# Patient Record
Sex: FEMALE | Race: WHITE | NOT HISPANIC OR LATINO | Employment: FULL TIME | URBAN - METROPOLITAN AREA
[De-identification: names, ages, dates, MRNs, and addresses within clinical notes are randomized per-mention and may not be internally consistent; named-entity substitution may affect disease eponyms.]

---

## 2024-11-03 ENCOUNTER — HOSPITAL ENCOUNTER (INPATIENT)
Facility: HOSPITAL | Age: 49
LOS: 3 days | Discharge: HOME | DRG: 101 | End: 2024-11-08
Attending: EMERGENCY MEDICINE | Admitting: FAMILY MEDICINE
Payer: COMMERCIAL

## 2024-11-03 ENCOUNTER — APPOINTMENT (OUTPATIENT)
Dept: CT IMAGING | Facility: HOSPITAL | Age: 49
DRG: 101 | End: 2024-11-03
Payer: COMMERCIAL

## 2024-11-03 ENCOUNTER — APPOINTMENT (OUTPATIENT)
Dept: GENERAL RADIOLOGY | Facility: HOSPITAL | Age: 49
DRG: 101 | End: 2024-11-03
Payer: COMMERCIAL

## 2024-11-03 DIAGNOSIS — R41.82 ALTERED MENTAL STATUS, UNSPECIFIED ALTERED MENTAL STATUS TYPE: Primary | ICD-10-CM

## 2024-11-03 DIAGNOSIS — R26.2 DIFFICULTY IN WALKING: ICD-10-CM

## 2024-11-03 LAB
ALBUMIN SERPL-MCNC: 3.4 G/DL (ref 3.5–5.2)
ALBUMIN/GLOB SERPL: 1.1 G/DL
ALP SERPL-CCNC: 80 U/L (ref 39–117)
ALT SERPL W P-5'-P-CCNC: 13 U/L (ref 1–33)
ANION GAP SERPL CALCULATED.3IONS-SCNC: 9.2 MMOL/L (ref 5–15)
AST SERPL-CCNC: 15 U/L (ref 1–32)
BASOPHILS # BLD AUTO: 0.05 10*3/MM3 (ref 0–0.2)
BASOPHILS NFR BLD AUTO: 0.5 % (ref 0–1.5)
BILIRUB SERPL-MCNC: 0.4 MG/DL (ref 0–1.2)
BILIRUB UR QL STRIP: NEGATIVE
BUN SERPL-MCNC: 11 MG/DL (ref 6–20)
BUN/CREAT SERPL: 13.8 (ref 7–25)
CALCIUM SPEC-SCNC: 8.8 MG/DL (ref 8.6–10.5)
CHLORIDE SERPL-SCNC: 103 MMOL/L (ref 98–107)
CLARITY UR: CLEAR
CO2 SERPL-SCNC: 27.8 MMOL/L (ref 22–29)
COLOR UR: YELLOW
CREAT SERPL-MCNC: 0.8 MG/DL (ref 0.57–1)
D-LACTATE SERPL-SCNC: 1.1 MMOL/L (ref 0.5–2)
DEPRECATED RDW RBC AUTO: 36.9 FL (ref 37–54)
DIGOXIN SERPL-MCNC: 0.75 NG/ML (ref 0.6–1.2)
EGFRCR SERPLBLD CKD-EPI 2021: 89.9 ML/MIN/1.73
EOSINOPHIL # BLD AUTO: 0.2 10*3/MM3 (ref 0–0.4)
EOSINOPHIL NFR BLD AUTO: 2 % (ref 0.3–6.2)
ERYTHROCYTE [DISTWIDTH] IN BLOOD BY AUTOMATED COUNT: 12.1 % (ref 12.3–15.4)
ETHANOL BLD-MCNC: <10 MG/DL (ref 0–10)
ETHANOL UR QL: <0.01 %
FENTANYL UR-MCNC: NEGATIVE NG/ML
FLUAV SUBTYP SPEC NAA+PROBE: NOT DETECTED
FLUBV RNA ISLT QL NAA+PROBE: NOT DETECTED
GLOBULIN UR ELPH-MCNC: 3.1 GM/DL
GLUCOSE SERPL-MCNC: 155 MG/DL (ref 65–99)
GLUCOSE UR STRIP-MCNC: NEGATIVE MG/DL
HCT VFR BLD AUTO: 37.5 % (ref 34–46.6)
HGB BLD-MCNC: 12.6 G/DL (ref 12–15.9)
HGB UR QL STRIP.AUTO: NEGATIVE
HOLD SPECIMEN: NORMAL
IMM GRANULOCYTES # BLD AUTO: 0.04 10*3/MM3 (ref 0–0.05)
IMM GRANULOCYTES NFR BLD AUTO: 0.4 % (ref 0–0.5)
KETONES UR QL STRIP: NEGATIVE
LEUKOCYTE ESTERASE UR QL STRIP.AUTO: NEGATIVE
LYMPHOCYTES # BLD AUTO: 2.13 10*3/MM3 (ref 0.7–3.1)
LYMPHOCYTES NFR BLD AUTO: 20.9 % (ref 19.6–45.3)
MCH RBC QN AUTO: 28.6 PG (ref 26.6–33)
MCHC RBC AUTO-ENTMCNC: 33.6 G/DL (ref 31.5–35.7)
MCV RBC AUTO: 85 FL (ref 79–97)
MONOCYTES # BLD AUTO: 0.65 10*3/MM3 (ref 0.1–0.9)
MONOCYTES NFR BLD AUTO: 6.4 % (ref 5–12)
NEUTROPHILS NFR BLD AUTO: 69.8 % (ref 42.7–76)
NEUTROPHILS NFR BLD AUTO: 7.12 10*3/MM3 (ref 1.7–7)
NITRITE UR QL STRIP: NEGATIVE
NRBC BLD AUTO-RTO: 0 /100 WBC (ref 0–0.2)
NT-PROBNP SERPL-MCNC: <36 PG/ML (ref 0–900)
PH UR STRIP.AUTO: 6 [PH] (ref 5–8)
PLATELET # BLD AUTO: 272 10*3/MM3 (ref 140–450)
PMV BLD AUTO: 8.4 FL (ref 6–12)
POTASSIUM SERPL-SCNC: 4 MMOL/L (ref 3.5–5.2)
PROT SERPL-MCNC: 6.5 G/DL (ref 6–8.5)
PROT UR QL STRIP: NEGATIVE
RBC # BLD AUTO: 4.41 10*6/MM3 (ref 3.77–5.28)
RSV RNA NPH QL NAA+NON-PROBE: NOT DETECTED
SARS-COV-2 RNA RESP QL NAA+PROBE: NOT DETECTED
SODIUM SERPL-SCNC: 140 MMOL/L (ref 136–145)
SP GR UR STRIP: 1.02 (ref 1–1.03)
T4 FREE SERPL-MCNC: 1.54 NG/DL (ref 0.92–1.68)
TROPONIN T SERPL HS-MCNC: <6 NG/L
TSH SERPL DL<=0.05 MIU/L-ACNC: 0.01 UIU/ML (ref 0.27–4.2)
UROBILINOGEN UR QL STRIP: NORMAL
WBC NRBC COR # BLD AUTO: 10.19 10*3/MM3 (ref 3.4–10.8)
WHOLE BLOOD HOLD COAG: NORMAL

## 2024-11-03 PROCEDURE — 87154 CUL TYP ID BLD PTHGN 6+ TRGT: CPT | Performed by: EMERGENCY MEDICINE

## 2024-11-03 PROCEDURE — 93005 ELECTROCARDIOGRAM TRACING: CPT | Performed by: EMERGENCY MEDICINE

## 2024-11-03 PROCEDURE — 80307 DRUG TEST PRSMV CHEM ANLYZR: CPT | Performed by: EMERGENCY MEDICINE

## 2024-11-03 PROCEDURE — 36415 COLL VENOUS BLD VENIPUNCTURE: CPT

## 2024-11-03 PROCEDURE — 80050 GENERAL HEALTH PANEL: CPT | Performed by: EMERGENCY MEDICINE

## 2024-11-03 PROCEDURE — 87040 BLOOD CULTURE FOR BACTERIA: CPT | Performed by: EMERGENCY MEDICINE

## 2024-11-03 PROCEDURE — 84439 ASSAY OF FREE THYROXINE: CPT | Performed by: EMERGENCY MEDICINE

## 2024-11-03 PROCEDURE — 84484 ASSAY OF TROPONIN QUANT: CPT | Performed by: EMERGENCY MEDICINE

## 2024-11-03 PROCEDURE — 80162 ASSAY OF DIGOXIN TOTAL: CPT | Performed by: EMERGENCY MEDICINE

## 2024-11-03 PROCEDURE — P9612 CATHETERIZE FOR URINE SPEC: HCPCS

## 2024-11-03 PROCEDURE — 87147 CULTURE TYPE IMMUNOLOGIC: CPT | Performed by: EMERGENCY MEDICINE

## 2024-11-03 PROCEDURE — 87637 SARSCOV2&INF A&B&RSV AMP PRB: CPT | Performed by: EMERGENCY MEDICINE

## 2024-11-03 PROCEDURE — 83880 ASSAY OF NATRIURETIC PEPTIDE: CPT | Performed by: EMERGENCY MEDICINE

## 2024-11-03 PROCEDURE — 81025 URINE PREGNANCY TEST: CPT | Performed by: INTERNAL MEDICINE

## 2024-11-03 PROCEDURE — 70450 CT HEAD/BRAIN W/O DYE: CPT

## 2024-11-03 PROCEDURE — 83605 ASSAY OF LACTIC ACID: CPT | Performed by: EMERGENCY MEDICINE

## 2024-11-03 PROCEDURE — 81003 URINALYSIS AUTO W/O SCOPE: CPT | Performed by: EMERGENCY MEDICINE

## 2024-11-03 PROCEDURE — 99285 EMERGENCY DEPT VISIT HI MDM: CPT

## 2024-11-03 PROCEDURE — 82077 ASSAY SPEC XCP UR&BREATH IA: CPT | Performed by: EMERGENCY MEDICINE

## 2024-11-03 PROCEDURE — 71045 X-RAY EXAM CHEST 1 VIEW: CPT

## 2024-11-03 RX ORDER — SODIUM CHLORIDE, SODIUM LACTATE, POTASSIUM CHLORIDE, CALCIUM CHLORIDE 600; 310; 30; 20 MG/100ML; MG/100ML; MG/100ML; MG/100ML
75 INJECTION, SOLUTION INTRAVENOUS CONTINUOUS
Status: DISCONTINUED | OUTPATIENT
Start: 2024-11-04 | End: 2024-11-04

## 2024-11-03 RX ORDER — DIGOXIN 250 MCG
250 TABLET ORAL DAILY
COMMUNITY
Start: 2024-10-27

## 2024-11-03 RX ORDER — LEVOTHYROXINE SODIUM 200 UG/1
200 TABLET ORAL DAILY
COMMUNITY

## 2024-11-03 RX ORDER — PROMETHAZINE HYDROCHLORIDE 25 MG/1
25 TABLET ORAL EVERY 6 HOURS PRN
COMMUNITY
Start: 2024-08-29 | End: 2024-11-03

## 2024-11-03 RX ORDER — OMEGA-3-ACID ETHYL ESTERS 1 G/1
2 CAPSULE, LIQUID FILLED ORAL
COMMUNITY
Start: 2024-07-24 | End: 2024-11-03

## 2024-11-03 RX ORDER — DICLOFENAC SODIUM 1 MG/ML
1 SOLUTION/ DROPS OPHTHALMIC 4 TIMES DAILY
COMMUNITY

## 2024-11-03 RX ORDER — ERGOCALCIFEROL 1.25 MG/1
50000 CAPSULE, LIQUID FILLED ORAL
COMMUNITY
Start: 2024-07-25

## 2024-11-03 RX ORDER — PRAVASTATIN SODIUM 40 MG
40 TABLET ORAL NIGHTLY
COMMUNITY
Start: 2024-07-25

## 2024-11-03 RX ORDER — DULOXETIN HYDROCHLORIDE 30 MG/1
30 CAPSULE, DELAYED RELEASE ORAL DAILY
COMMUNITY
Start: 2024-10-22

## 2024-11-03 RX ORDER — ATOGEPANT 60 MG/1
1 TABLET ORAL DAILY
COMMUNITY
Start: 2024-10-07

## 2024-11-03 RX ORDER — TRAZODONE HYDROCHLORIDE 50 MG/1
50 TABLET, FILM COATED ORAL
COMMUNITY
Start: 2024-10-14 | End: 2024-11-03

## 2024-11-03 RX ORDER — DULOXETIN HYDROCHLORIDE 60 MG/1
60 CAPSULE, DELAYED RELEASE ORAL DAILY
COMMUNITY
Start: 2024-10-14

## 2024-11-03 RX ORDER — DILTIAZEM HYDROCHLORIDE 240 MG/1
240 CAPSULE, EXTENDED RELEASE ORAL DAILY
COMMUNITY
Start: 2024-06-24

## 2024-11-03 RX ORDER — FUROSEMIDE 40 MG/1
40 TABLET ORAL DAILY
COMMUNITY
Start: 2024-06-24 | End: 2024-11-03

## 2024-11-03 RX ORDER — LANSOPRAZOLE 30 MG/1
30 CAPSULE, DELAYED RELEASE ORAL DAILY
COMMUNITY
Start: 2024-01-12 | End: 2025-01-11

## 2024-11-03 RX ORDER — METOPROLOL SUCCINATE 50 MG/1
50 TABLET, EXTENDED RELEASE ORAL 2 TIMES DAILY
COMMUNITY
Start: 2024-10-27

## 2024-11-03 RX ORDER — DOXYCYCLINE 100 MG/1
100 CAPSULE ORAL 2 TIMES DAILY
COMMUNITY
End: 2024-11-08 | Stop reason: HOSPADM

## 2024-11-03 RX ORDER — HYDROXYZINE HYDROCHLORIDE 25 MG/1
1 TABLET, FILM COATED ORAL EVERY 8 HOURS PRN
COMMUNITY
Start: 2024-10-28

## 2024-11-04 PROBLEM — E66.01 MORBID OBESITY: Status: ACTIVE | Noted: 2024-11-04

## 2024-11-04 PROBLEM — I10 ESSENTIAL HYPERTENSION: Status: ACTIVE | Noted: 2024-11-04

## 2024-11-04 PROBLEM — E03.9 HYPOTHYROIDISM (ACQUIRED): Status: ACTIVE | Noted: 2024-11-04

## 2024-11-04 LAB
AMPHET+METHAMPHET UR QL: NEGATIVE
AMPHETAMINES UR QL: NEGATIVE
ARTERIAL PATENCY WRIST A: POSITIVE
ATMOSPHERIC PRESS: 744.3 MMHG
B-HCG UR QL: NEGATIVE
BACTERIA BLD CULT: ABNORMAL
BARBITURATES UR QL SCN: NEGATIVE
BASE EXCESS BLDA CALC-SCNC: 3.7 MMOL/L (ref -2–2)
BDY SITE: ABNORMAL
BENZODIAZ UR QL SCN: POSITIVE
BOTTLE TYPE: ABNORMAL
BUPRENORPHINE SERPL-MCNC: NEGATIVE NG/ML
CANNABINOIDS SERPL QL: POSITIVE
COCAINE UR QL: NEGATIVE
GEN 5 2HR TROPONIN T REFLEX: <6 NG/L
HCO3 BLDA-SCNC: 28.1 MMOL/L (ref 22–26)
HCT VFR BLD CALC: 39 % (ref 38–51)
HEMODILUTION: NO
HGB BLDA-MCNC: 13.3 G/DL (ref 12–18)
INHALED O2 CONCENTRATION: 21 %
METHADONE UR QL SCN: NEGATIVE
MODALITY: ABNORMAL
OPIATES UR QL: NEGATIVE
OXYCODONE UR QL SCN: NEGATIVE
PCO2 BLDA: 40.7 MM HG (ref 35–45)
PCP UR QL SCN: NEGATIVE
PH BLDA: 7.45 PH UNITS (ref 7.35–7.45)
PO2 BLD: 363 MM[HG] (ref 0–500)
PO2 BLDA: 76.2 MM HG (ref 80–100)
QT INTERVAL: 394 MS
QTC INTERVAL: 467 MS
SAO2 % BLDCOA: 95.7 % (ref 95–99)
TRICYCLICS UR QL SCN: NEGATIVE
TROPONIN T DELTA: NORMAL

## 2024-11-04 PROCEDURE — 82803 BLOOD GASES ANY COMBINATION: CPT

## 2024-11-04 PROCEDURE — 36600 WITHDRAWAL OF ARTERIAL BLOOD: CPT

## 2024-11-04 PROCEDURE — G0378 HOSPITAL OBSERVATION PER HR: HCPCS

## 2024-11-04 PROCEDURE — 84484 ASSAY OF TROPONIN QUANT: CPT | Performed by: EMERGENCY MEDICINE

## 2024-11-04 PROCEDURE — 92610 EVALUATE SWALLOWING FUNCTION: CPT

## 2024-11-04 PROCEDURE — 25810000003 LACTATED RINGERS PER 1000 ML: Performed by: FAMILY MEDICINE

## 2024-11-04 PROCEDURE — 97161 PT EVAL LOW COMPLEX 20 MIN: CPT

## 2024-11-04 PROCEDURE — 25010000002 ENOXAPARIN PER 10 MG: Performed by: FAMILY MEDICINE

## 2024-11-04 PROCEDURE — 99222 1ST HOSP IP/OBS MODERATE 55: CPT | Performed by: FAMILY MEDICINE

## 2024-11-04 PROCEDURE — 99222 1ST HOSP IP/OBS MODERATE 55: CPT | Performed by: PSYCHIATRY & NEUROLOGY

## 2024-11-04 RX ORDER — ONDANSETRON 2 MG/ML
4 INJECTION INTRAMUSCULAR; INTRAVENOUS EVERY 6 HOURS PRN
Status: DISCONTINUED | OUTPATIENT
Start: 2024-11-04 | End: 2024-11-08 | Stop reason: HOSPADM

## 2024-11-04 RX ORDER — DULOXETIN HYDROCHLORIDE 30 MG/1
60 CAPSULE, DELAYED RELEASE ORAL DAILY
Status: DISCONTINUED | OUTPATIENT
Start: 2024-11-04 | End: 2024-11-08 | Stop reason: HOSPADM

## 2024-11-04 RX ORDER — DIGOXIN 125 MCG
250 TABLET ORAL DAILY
Status: DISCONTINUED | OUTPATIENT
Start: 2024-11-04 | End: 2024-11-08 | Stop reason: HOSPADM

## 2024-11-04 RX ORDER — LEVOTHYROXINE SODIUM 100 UG/1
200 TABLET ORAL
Status: DISCONTINUED | OUTPATIENT
Start: 2024-11-04 | End: 2024-11-08 | Stop reason: HOSPADM

## 2024-11-04 RX ORDER — SODIUM CHLORIDE 9 MG/ML
40 INJECTION, SOLUTION INTRAVENOUS AS NEEDED
Status: DISCONTINUED | OUTPATIENT
Start: 2024-11-04 | End: 2024-11-08 | Stop reason: HOSPADM

## 2024-11-04 RX ORDER — DILTIAZEM HYDROCHLORIDE 240 MG/1
240 CAPSULE, COATED, EXTENDED RELEASE ORAL
Status: DISCONTINUED | OUTPATIENT
Start: 2024-11-04 | End: 2024-11-08 | Stop reason: HOSPADM

## 2024-11-04 RX ORDER — AMOXICILLIN 250 MG
2 CAPSULE ORAL 2 TIMES DAILY PRN
Status: DISCONTINUED | OUTPATIENT
Start: 2024-11-04 | End: 2024-11-08 | Stop reason: HOSPADM

## 2024-11-04 RX ORDER — DULOXETIN HYDROCHLORIDE 30 MG/1
30 CAPSULE, DELAYED RELEASE ORAL DAILY
Status: DISCONTINUED | OUTPATIENT
Start: 2024-11-04 | End: 2024-11-08 | Stop reason: HOSPADM

## 2024-11-04 RX ORDER — SODIUM CHLORIDE 0.9 % (FLUSH) 0.9 %
10 SYRINGE (ML) INJECTION AS NEEDED
Status: DISCONTINUED | OUTPATIENT
Start: 2024-11-04 | End: 2024-11-08 | Stop reason: HOSPADM

## 2024-11-04 RX ORDER — BISACODYL 5 MG/1
5 TABLET, DELAYED RELEASE ORAL DAILY PRN
Status: DISCONTINUED | OUTPATIENT
Start: 2024-11-04 | End: 2024-11-08 | Stop reason: HOSPADM

## 2024-11-04 RX ORDER — BISACODYL 10 MG
10 SUPPOSITORY, RECTAL RECTAL DAILY PRN
Status: DISCONTINUED | OUTPATIENT
Start: 2024-11-04 | End: 2024-11-08 | Stop reason: HOSPADM

## 2024-11-04 RX ORDER — METOPROLOL SUCCINATE 50 MG/1
50 TABLET, EXTENDED RELEASE ORAL 2 TIMES DAILY
Status: DISCONTINUED | OUTPATIENT
Start: 2024-11-04 | End: 2024-11-08 | Stop reason: HOSPADM

## 2024-11-04 RX ORDER — HYDROXYZINE HYDROCHLORIDE 25 MG/1
25 TABLET, FILM COATED ORAL EVERY 8 HOURS PRN
Status: DISCONTINUED | OUTPATIENT
Start: 2024-11-04 | End: 2024-11-08 | Stop reason: HOSPADM

## 2024-11-04 RX ORDER — PRAVASTATIN SODIUM 20 MG
40 TABLET ORAL NIGHTLY
Status: DISCONTINUED | OUTPATIENT
Start: 2024-11-04 | End: 2024-11-08 | Stop reason: HOSPADM

## 2024-11-04 RX ORDER — ENOXAPARIN SODIUM 100 MG/ML
40 INJECTION SUBCUTANEOUS EVERY 12 HOURS
Status: DISCONTINUED | OUTPATIENT
Start: 2024-11-04 | End: 2024-11-08 | Stop reason: HOSPADM

## 2024-11-04 RX ORDER — POLYETHYLENE GLYCOL 3350 17 G/17G
17 POWDER, FOR SOLUTION ORAL DAILY PRN
Status: DISCONTINUED | OUTPATIENT
Start: 2024-11-04 | End: 2024-11-08 | Stop reason: HOSPADM

## 2024-11-04 RX ORDER — SODIUM CHLORIDE 0.9 % (FLUSH) 0.9 %
10 SYRINGE (ML) INJECTION EVERY 12 HOURS SCHEDULED
Status: DISCONTINUED | OUTPATIENT
Start: 2024-11-04 | End: 2024-11-08 | Stop reason: HOSPADM

## 2024-11-04 RX ADMIN — Medication 10 ML: at 09:25

## 2024-11-04 RX ADMIN — DULOXETINE HYDROCHLORIDE 30 MG: 30 CAPSULE, DELAYED RELEASE ORAL at 09:25

## 2024-11-04 RX ADMIN — Medication 10 ML: at 20:58

## 2024-11-04 RX ADMIN — PRAVASTATIN SODIUM 40 MG: 20 TABLET ORAL at 20:57

## 2024-11-04 RX ADMIN — ENOXAPARIN SODIUM 40 MG: 100 INJECTION SUBCUTANEOUS at 20:57

## 2024-11-04 RX ADMIN — LEVOTHYROXINE SODIUM 200 MCG: 0.1 TABLET ORAL at 09:24

## 2024-11-04 RX ADMIN — SODIUM CHLORIDE, POTASSIUM CHLORIDE, SODIUM LACTATE AND CALCIUM CHLORIDE 75 ML/HR: 600; 310; 30; 20 INJECTION, SOLUTION INTRAVENOUS at 01:42

## 2024-11-04 RX ADMIN — METOPROLOL SUCCINATE 50 MG: 50 TABLET, EXTENDED RELEASE ORAL at 20:57

## 2024-11-04 RX ADMIN — Medication 10 ML: at 01:44

## 2024-11-04 RX ADMIN — DILTIAZEM HYDROCHLORIDE 240 MG: 240 CAPSULE, EXTENDED RELEASE ORAL at 09:24

## 2024-11-04 RX ADMIN — DULOXETINE HYDROCHLORIDE 60 MG: 30 CAPSULE, DELAYED RELEASE ORAL at 09:25

## 2024-11-04 RX ADMIN — ENOXAPARIN SODIUM 40 MG: 100 INJECTION SUBCUTANEOUS at 09:24

## 2024-11-04 RX ADMIN — METOPROLOL SUCCINATE 50 MG: 50 TABLET, EXTENDED RELEASE ORAL at 09:24

## 2024-11-04 RX ADMIN — DIGOXIN 250 MCG: 0.12 TABLET ORAL at 09:24

## 2024-11-04 NOTE — PLAN OF CARE
Goal Outcome Evaluation:  Plan of Care Reviewed With: (P) patient           Outcome Evaluation: (P) Pt presents with deficits in strength, mobility, and ambulation deficits affecting her functional abilities. Skilled PT is indicated at this time. Recommend Inpatient rehab upon discharge from hospital.    Anticipated Discharge Disposition (PT): (P) inpatient rehabilitation facility

## 2024-11-04 NOTE — PLAN OF CARE
Goal Outcome Evaluation:  Plan of Care Reviewed With: patient, spouse        Progress: improving  Outcome Evaluation: pt aa0x4, slightly drowsy, arouses easily, sleeping at intervals.  timothy at bedside. pt voices understanding not to be up without assitance of staffl. no changes in pt's status.

## 2024-11-04 NOTE — THERAPY EVALUATION
Acute Care - Speech Language Pathology   Swallow Initial Evaluation [facility]     Patient Name: Brittny HERNÁNDEZ  : 1975  MRN: [id number]  Today's Date: 2024               Admit Date: 11/3/2024    Visit Dx:     ICD-10-CM ICD-9-CM   1. Altered mental status, unspecified altered mental status type  R41.82 780.97     Patient Active Problem List   Diagnosis    AMS (altered mental status)    Essential hypertension    Hypothyroidism (acquired)    Morbid obesity     Past Medical History:   Diagnosis Date    Abnormal Pap smear of cervix     CHF (congestive heart failure)     Depression     H/O blood clots     Heart beat abnormality     Hypertension     Reflux gastritis     Sleep apnea     Thyroid cancer      Past Surgical History:   Procedure Laterality Date    CHOLECYSTECTOMY      ENDOMETRIAL ABLATION      FOOT GANGLION EXCISION Right     GASTRIC FUNDOPLICATION      TUBAL ABDOMINAL LIGATION         SLP Recommendation and Plan          Inpatient Speech Pathology Dysphagia Evaluation        PAIN SCALE: Patient did not indicate that she was having any pain.    PRECAUTIONS/CONTRAINDICATIONS:  Aspiration precautions. Standard precautions.    SUSPECTED ABUSE/NEGLECT/EXPLOITATION:  No    PRIOR FUNCTION:  within normal limits. Occasional difficulty with globus sensation or difficulty with initiation of swallow which often correlates with reflux symptoms.     PATIENT GOALS/EXPECTATIONS:  to consume least restrictive diet without signs/symptoms of aspiration.    HISTORY:  Patient is a 49 year old female that presented to the ED with altered mental status. In ED, family reported the patient had been sleeping most of the day and when she was woken up she had slurred speech and weakness in both legs. Patient has a history of HTN, hypothyroidism, MANUELA, morbid obesity, and GERD. Patient's drug screen came back positive for benzodiazepine and THC.     CURRENT DIET LEVEL:  Level 7 regular solids and level 0 thin  liquids.    OBJECTIVE:    TEST ADMINISTERED:  Oral mechanisms exam and clinical swallow evaluation.     COGNITION/SAFETY AWARENESS:  Patient response to stimuli was delayed and slow likely related to her fatigue/lethargy. Patient's cognition appear to otherwise be within normal limits if given time to process information and respond.     BEHAVIORAL OBSERVATIONS:  Patient appeared to be fatigued and lethargic. Patient's movements were slow and effortful. Patient was appropriate in conversation but was quite and appeared tired. Patient was encouraged to only eat when alert.    ORAL MOTOR EXAM:  Patient was slow to process instructions in the oral motor exam but was able to complete them with no difficulty once initiated. Patient did not appear to have any one sided weakness and had mild weakness likely related to fatigue. Otherwise, her oral mechanisms appeared to be within normal limits.    VOICE QUALITY:  quiet and breathy.     REFLEX EXAM:  Patient's velopharyngeal function appeared to be within normal limits.     POSTURE:  Upright for oral care and when eating or drinking.     FEEDING/SWALLOWING FUNCTION:  Patient participated in clinical swallow evaluation and trialed level 0 thin liquids via cup and straw, level 4 pureed solids, level 6 soft and bite sized solids, and level 7 regular solids. Patient did not demonstrate any overt signs/symptoms of aspiration.     CLINICAL OBSERVATIONS:  Patient's movements were slow and effortful. Patient's swallow appeared slightly delayed but largely within normal limits. Patient's symptoms may be related to her diagnosis of GERD and she may benefit from follow up with GI or reevaluation of current medication.    DYSPHAGIA CRITERIA:  Aspiration precautions. Failed nursing screen.    FUNCTIONAL ASSESSMENT INSTRUMENT: Patient currently scored a level 6 of 7 on Functional Communication Measures for swallowing indicating a 15% limitation in function.    ASSESSMENT/ PLAN OF  CARE:  Patient does not require follow up speech services at this time. Patient may benefit from reassessment of chronic reflux as increased difficulty with swallow appears to coincide with increased reflux symptoms.     RECOMMENDATIONS:   1.   DIET: Level 7 regular solids and level 0 thin liquids.    2.  POSITION: Upright for oral care and when eating or drinking.    3.  COMPENSATORY STRATEGIES: Eat at a slow rate and take small bites and sips. Complete oral care frequently. Complete an effortful swallow with a liquid wash as needed for globus sensation.     Pt/responsible party agrees with plan of care and has been informed of all alternatives, risks and benefits.                                                                                    EDUCATION  The patient has been educated in the following areas:   Dysphagia (Swallowing Impairment).                Time Calculation:    Time Calculation- SLP       Row Name 11/04/24 1100             Time Calculation- SLP    SLP Start Time 1000  -AW      SLP Stop Time 1100  -AW      SLP Time Calculation (min) 60 min  -AW         Untimed Charges    35416-RV Eval Oral Pharyng Swallow Minutes 60  -AW         Total Minutes    Untimed Charges Total Minutes 60  -AW       Total Minutes 60  -AW                User Key  (r) = Recorded By, (t) = Taken By, (c) = Cosigned By      Initials Name Provider Type    Lili Washington SLP Speech and Language Pathologist                    Therapy Charges for Today       Code Description Service Date Service Provider Modifiers Qty    [id number] HC ST EVAL ORAL PHARYNG SWALLOW 4 11/4/2024 Lili Ba SLP GN 1                 JEANETTE Muro  11/4/2024

## 2024-11-04 NOTE — ED PROVIDER NOTES
Time: 8:36 PM EST  Date of encounter:  11/3/2024  Independent Historian/Clinical History and Information was obtained by:   Patient and Family    History is limited by: Altered Mental Status    Chief Complaint: Altered mental status      History of Present Illness:  Patient is a 49 y.o. year old female who presents to the emergency department for evaluation of altered mental status.  Family reports that the patient been sleeping most of the day today.  They woke her up this evening and was noted that she was altered and not acting herself.  They also state that she was a little bit slurred in her speech as well as was having weakness in both her legs and was having difficulty getting around.  States this is not normally how she is.  Does have a history of hypertension, sick sinus syndrome.      Patient Care Team  Primary Care Provider: Harshakumar, Sreedevi Pallath, MD    Past Medical History:     Allergies   Allergen Reactions    Codeine     Demerol [Meperidine]     Percocet [Oxycodone-Acetaminophen]      Past Medical History:   Diagnosis Date    Abnormal Pap smear of cervix     Depression     H/O blood clots     Heart beat abnormality     Hypertension     Reflux gastritis     Sleep apnea     Thyroid cancer      Past Surgical History:   Procedure Laterality Date    CHOLECYSTECTOMY      ENDOMETRIAL ABLATION      FOOT GANGLION EXCISION Right     GASTRIC FUNDOPLICATION      TUBAL ABDOMINAL LIGATION       Family History   Problem Relation Age of Onset    Hypertension Mother     Arthritis Mother     Hyperlipidemia Mother     Heart disease Paternal Grandfather     Breast cancer Paternal Grandmother     Ovarian cancer Paternal Grandmother     Breast cancer Maternal Grandmother     Heart disease Maternal Grandfather        Home Medications:  Prior to Admission medications    Medication Sig Start Date End Date Taking? Authorizing Provider   Atogepant (Qulipta) 60 MG tablet Take 1 tablet by mouth Daily. 10/7/24  Yes  Tomeka Sorto MD   digoxin (LANOXIN) 250 MCG tablet Take 1 tablet by mouth Daily. 10/27/24  Yes Tomeka Sorto MD   dilTIAZem (TIAZAC) 240 MG 24 hr capsule Take 1 capsule by mouth Daily. 6/24/24  Yes Tomeka Sorto MD   DULoxetine (CYMBALTA) 30 MG capsule 1 capsule. 10/22/24  Yes Tomeka Sorto MD   DULoxetine (CYMBALTA) 60 MG capsule 1 capsule. 10/14/24  Yes Tomeka Sorto MD   furosemide (LASIX) 40 MG tablet Take 1 tablet by mouth Daily. 6/24/24  Yes Tomeka Sorto MD   hydrOXYzine (ATARAX) 25 MG tablet Take 1 tablet by mouth Every 8 (Eight) Hours As Needed. 10/28/24  Yes Tomeka Sorto MD   lansoprazole (PREVACID) 30 MG capsule Take 1 capsule by mouth Daily. 1/12/24 1/11/25 Yes Tomeka Sorto MD   omega-3 acid ethyl esters (LOVAZA) 1 g capsule Take 2 capsules by mouth. 7/24/24 7/19/25 Yes Tomeka Sorto MD   pravastatin (PRAVACHOL) 40 MG tablet Take 1 tablet by mouth Every Night. 7/25/24  Yes Tomeka Sorto MD   promethazine (PHENERGAN) 25 MG tablet Take 1 tablet by mouth Every 6 (Six) Hours As Needed. 8/29/24  Yes Tomeka Sorto MD   traZODone (DESYREL) 50 MG tablet 1 tablet. 10/14/24  Yes Tomeka Sorto MD   vitamin D (ERGOCALCIFEROL) 1.25 MG (74661 UT) capsule capsule Take 1 capsule by mouth Every 7 (Seven) Days. 7/25/24  Yes Tomeka Sorto MD   levothyroxine (SYNTHROID, LEVOTHROID) 200 MCG tablet Take 200 mcg by mouth Daily.    Tomeka Sorto MD   metoprolol tartrate (LOPRESSOR) injection     Tomeka Sorto MD   diltiaZEM CD (CARDIZEM CD) 300 MG 24 hr capsule Take 300 mg by mouth Daily.  11/3/24  Tomeka Sorto MD        Social History:   Social History     Tobacco Use    Smoking status: Never   Substance Use Topics    Alcohol use: Yes     Comment: occ    Drug use: No         Review of Systems:  Review of Systems     Physical Exam:  /85   Pulse 78   Temp 97.5 °F (36.4 °C) (Oral)   Resp 15   " Ht 160 cm (63\")   Wt 103 kg (227 lb 15.3 oz)   LMP  (LMP Unknown)   SpO2 93%   Breastfeeding No   BMI 40.38 kg/m²     Physical Exam  Vitals and nursing note reviewed.   Constitutional:       Appearance: Normal appearance.   HENT:      Head: Normocephalic and atraumatic.   Eyes:      General: No scleral icterus.  Cardiovascular:      Rate and Rhythm: Normal rate and regular rhythm.      Heart sounds: Normal heart sounds.   Pulmonary:      Effort: Pulmonary effort is normal.      Breath sounds: Normal breath sounds.   Abdominal:      Palpations: Abdomen is soft.      Tenderness: There is no abdominal tenderness.   Musculoskeletal:         General: Normal range of motion.      Cervical back: Normal range of motion.   Skin:     Findings: No rash.   Neurological:      General: No focal deficit present.      Mental Status: She is alert and oriented to person, place, and time.      Cranial Nerves: No cranial nerve deficit.      Motor: No weakness.                  Procedures:  Procedures      Medical Decision Making:      Comorbidities that affect care:    Hypertension, sick sinus syndrome    External Notes reviewed:  Reviewed note from 10/10/2024        The following orders were placed and all results were independently analyzed by me:  Orders Placed This Encounter   Procedures    COVID-19, FLU A/B, RSV PCR 1 HR TAT - Swab, Nasopharynx    Blood Culture - Blood,    Blood Culture - Blood,    CT Head Without Contrast    XR Chest 1 View    Comprehensive Metabolic Panel    Lactic Acid, Plasma    High Sensitivity Troponin T    BNP    CBC Auto Differential    Urinalysis With Culture If Indicated - Urine, Catheter    TSH Rfx On Abnormal To Free T4    Digoxin Level    T4, Free    High Sensitivity Troponin T 2Hr    Ethanol    Urine Drug Screen - Urine, Clean Catch    Inpatient Hospitalist Consult    SLP Consult: Eval & Treat RN Dysphagia Screen Failed    ECG 12 Lead Altered Mental Status    CBC & Differential    Extra Tubes "    Gold Top - SST    Light Blue Top       Medications Given in the Emergency Department:  Medications - No data to display     ED Course:    ED Course as of 11/03/24 2343   Sun Nov 03, 2024 2223 EKG interpreted by me  Time: 2041  Heart rate 84  Sinus, left axis deviation, nonspecific ST changes [MA]   2330 Recheck patient.  Patient still slightly confused at this time.  Not currently at baseline per the family.  Will admit to the hospital for further workup management. [MA]      ED Course User Index  [MA] Garland Whittington MD       Labs:    Lab Results (last 24 hours)       Procedure Component Value Units Date/Time    COVID-19, FLU A/B, RSV PCR 1 HR TAT - Swab, Nasopharynx [[id number]]  (Normal) Collected: 11/03/24 2043    Specimen: Swab from Nasopharynx Updated: 11/03/24 2126     COVID19 Not Detected     Influenza A PCR Not Detected     Influenza B PCR Not Detected     RSV, PCR Not Detected    Narrative:      Fact sheet for providers: https://www.fda.gov/media/045077/download    Fact sheet for patients: https://www.fda.gov/media/181125/download    Test performed by PCR.    CBC & Differential [[id number]]  (Abnormal) Collected: 11/03/24 2125    Specimen: Blood Updated: 11/03/24 2134    Narrative:      The following orders were created for panel order CBC & Differential.  Procedure                               Abnormality         Status                     ---------                               -----------         ------                     CBC Auto Differential[[id number]]        Abnormal            Final result                 Please view results for these tests on the individual orders.    Comprehensive Metabolic Panel [[id number]]  (Abnormal) Collected: 11/03/24 2125    Specimen: Blood Updated: 11/03/24 2202     Glucose 155 mg/dL      BUN 11 mg/dL      Creatinine 0.80 mg/dL      Sodium 140 mmol/L      Potassium 4.0 mmol/L      Comment: Slight hemolysis detected by analyzer. Result may be falsely elevated.         Chloride 103 mmol/L      CO2 27.8 mmol/L      Calcium 8.8 mg/dL      Total Protein 6.5 g/dL      Albumin 3.4 g/dL      ALT (SGPT) 13 U/L      AST (SGOT) 15 U/L      Comment: Slight hemolysis detected by analyzer. Result may be falsely elevated.        Alkaline Phosphatase 80 U/L      Total Bilirubin 0.4 mg/dL      Globulin 3.1 gm/dL      A/G Ratio 1.1 g/dL      BUN/Creatinine Ratio 13.8     Anion Gap 9.2 mmol/L      eGFR 89.9 mL/min/1.73     Narrative:      GFR Normal >60  Chronic Kidney Disease <60  Kidney Failure <15      Blood Culture - Blood, Arm, Left [[id number]] Collected: 11/03/24 2125    Specimen: Blood from Arm, Left Updated: 11/03/24 2128    Lactic Acid, Plasma [[id number]]  (Normal) Collected: 11/03/24 2125    Specimen: Blood Updated: 11/03/24 2152     Lactate 1.1 mmol/L     High Sensitivity Troponin T [[id number]]  (Normal) Collected: 11/03/24 2125    Specimen: Blood Updated: 11/03/24 2158     HS Troponin T <6 ng/L     Narrative:      High Sensitive Troponin T Reference Range:  <14.0 ng/L- Negative Female for AMI  <22.0 ng/L- Negative Male for AMI  >=14 - Abnormal Female indicating possible myocardial injury.  >=22 - Abnormal Male indicating possible myocardial injury.   Clinicians would have to utilize clinical acumen, EKG, Troponin, and serial changes to determine if it is an Acute Myocardial Infarction or myocardial injury due to an underlying chronic condition.         BNP [[id number]]  (Normal) Collected: 11/03/24 2125    Specimen: Blood Updated: 11/03/24 2158     proBNP <36.0 pg/mL     Narrative:      This assay is used as an aid in the diagnosis of individuals suspected of having heart failure. It can be used as an aid in the diagnosis of acute decompensated heart failure (ADHF) in patients presenting with signs and symptoms of ADHF to the emergency department (ED). In addition, NT-proBNP of <300 pg/mL indicates ADHF is not likely.    Age Range Result Interpretation  NT-proBNP Concentration  (pg/mL:      <50             Positive            >450                   Gray                 300-450                    Negative             <300    50-75           Positive            >900                  Gray                300-900                  Negative            <300      >75             Positive            >1800                  Gray                300-1800                  Negative            <300    CBC Auto Differential [[id number]]  (Abnormal) Collected: 11/03/24 2125    Specimen: Blood Updated: 11/03/24 2134     WBC 10.19 10*3/mm3      RBC 4.41 10*6/mm3      Hemoglobin 12.6 g/dL      Hematocrit 37.5 %      MCV 85.0 fL      MCH 28.6 pg      MCHC 33.6 g/dL      RDW 12.1 %      RDW-SD 36.9 fl      MPV 8.4 fL      Platelets 272 10*3/mm3      Neutrophil % 69.8 %      Lymphocyte % 20.9 %      Monocyte % 6.4 %      Eosinophil % 2.0 %      Basophil % 0.5 %      Immature Grans % 0.4 %      Neutrophils, Absolute 7.12 10*3/mm3      Lymphocytes, Absolute 2.13 10*3/mm3      Monocytes, Absolute 0.65 10*3/mm3      Eosinophils, Absolute 0.20 10*3/mm3      Basophils, Absolute 0.05 10*3/mm3      Immature Grans, Absolute 0.04 10*3/mm3      nRBC 0.0 /100 WBC     TSH Rfx On Abnormal To Free T4 [[id number]]  (Abnormal) Collected: 11/03/24 2125    Specimen: Blood Updated: 11/03/24 2158     TSH 0.010 uIU/mL     Digoxin Level [[id number]]  (Normal) Collected: 11/03/24 2125    Specimen: Blood Updated: 11/03/24 2154     Digoxin 0.75 ng/mL     T4, Free [[id number]]  (Normal) Collected: 11/03/24 2125    Specimen: Blood Updated: 11/03/24 2224     Free T4 1.54 ng/dL     Blood Culture - Blood, Arm, Left [[id number]] Collected: 11/03/24 2128    Specimen: Blood from Arm, Left Updated: 11/03/24 2136    Ethanol [[id number]] Collected: 11/03/24 2130    Specimen: Blood Updated: 11/03/24 2341    Urinalysis With Culture If Indicated - Straight Cath [[id number]]  (Normal) Collected: 11/03/24 2214    Specimen: Urine from Straight Cath Updated:  11/03/24 2227     Color, UA Yellow     Appearance, UA Clear     pH, UA 6.0     Specific Gravity, UA 1.022     Glucose, UA Negative     Ketones, UA Negative     Bilirubin, UA Negative     Blood, UA Negative     Protein, UA Negative     Leuk Esterase, UA Negative     Nitrite, UA Negative     Urobilinogen, UA 1.0 E.U./dL    Narrative:      In absence of clinical symptoms, the presence of pyuria, bacteria, and/or nitrites on the urinalysis result does not correlate with infection.  Urine microscopic not indicated.             Imaging:    XR Chest 1 View    Result Date: 11/3/2024  XR CHEST 1 VW-  Date of exam: 11/3/2024 8:46 PM.  Indication: ams  (altered mental status)    Comparison: None available.  FINDINGS: A single frontal (AP or PA upright portable) chest radiograph reveals borderline cardiac enlargement and no acute infiltrate. No pneumothorax is seen. No pleural effusion. There is a left-sided cardiac implantable electronic device (CIED) in place. There is pulmonary hypoinflation. Nonspecific punctate hyperdensity is projected over the superior mediastinum, above the level of the cardiac pacing leads. It measures about 2 to 3 mm in size and is of uncertain significance or origin.       No acute cardiopulmonary disease is seen radiographically. Please see above comments for further detail.    Portions of this note were completed with a voice recognition program.  Electronically Signed By-Husam Cruz MD On:11/3/2024 9:42 PM      CT Head Without Contrast    Result Date: 11/3/2024  CT HEAD WO CONTRAST-  Date of exam: 11/3/2024 9:11 PM.  Indication: ams (altered mental status); no history of head trauma is provided  Comparison: None available.  TECHNIQUE: Axial CT images were obtained of the head/brain without contrast administration. Reconstructed 2D (two-dimensional) coronal and sagittal images were also obtained. Automated exposure control and iterative construction methods were used. Additionally, the radiation  dose reduction device was turned on for each scan per the ALARA (As Low as Reasonably Achievable) protocol. Please see the electronic medical record (EMR; Epic) for the recorded radiation dose.  FINDINGS: A routine nonenhanced head CT was performed. No acute brain abnormality is identified. No acute intracranial hemorrhage. No acute infarct is seen. The gray-white matter differentiation is preserved. No midline shift or acute intracranial mass effect is seen. The ventricular size and configuration are within normal limits. The extra-axial spaces are within normal limits. No acute skull fracture. No significant acute findings are seen with regard to the imaged orbits, paranasal sinuses, or middle ear clefts.       No acute brain abnormality is seen. Specifically, no acute intracranial hemorrhage, no acute infarct, no significant intracranial mass lesion, and no acute intracranial mass effect are appreciated.    Portions of this note were completed with a voice recognition program.  Electronically Signed By-Husam Cruz MD On:11/3/2024 9:28 PM         Differential Diagnosis and Discussion:    Altered Mental Status: Based on the patient's signs and symptoms, differential diagnosis includes but is not limited to meningitis, stroke, sepsis, subarachnoid hemorrhage, intracranial bleeding, encephalitis, and metabolic encephalopathy.    All labs were reviewed and interpreted by me.  All X-rays impressions were independently interpreted by me.  EKG was interpreted by me.  CT scan radiology impression was interpreted by me.    MDM       Patient is a 51-year-old female who presents with mental status change.  Family reports that she has been sleeping throughout the day and woke up this afternoon and was confused and not acting herself.  They also reported some slurred speech.  They state that she has not been back to her baseline.  On my exam she was a little bit lethargic when I initially evaluated her but she was otherwise  alert and awake and oriented.  There is no focal deficits on my exam.  She was not slurring her speech.  I reassessed her and she was still hard to arouse but would wake up and tell me where she is at and who was in the room with her.  Family states this is not her baseline.  States that she just not acting herself.  Will admit to the hospital for further workup and management.  Talk screens are pending at this time.  She is otherwise stable.  Will admit.              Patient Care Considerations:          Consultants/Shared Management Plan:    Hospitalist: I have discussed the case with Dr. Chong who agrees to accept the patient for admission.    Social Determinants of Health:          Disposition and Care Coordination:    Admit:   Through independent evaluation of the patient's history, physical, and imperical data, the patient meets criteria for inpatient admission to the hospital.        Final diagnoses:   Altered mental status, unspecified altered mental status type        ED Disposition       ED Disposition   Decision to Admit    Condition   --    Comment   --               This medical record created using voice recognition software.             Garland Whittington MD  11/03/24 8149

## 2024-11-04 NOTE — THERAPY EVALUATION
Acute Care - Physical Therapy Initial Evaluation   James     Patient Name: Brittny HERNÁNDEZ  : 1975  MRN: [id number]  Today's Date: 2024      Visit Dx:     ICD-10-CM ICD-9-CM   1. Altered mental status, unspecified altered mental status type  R41.82 780.97   2. Difficulty in walking  R26.2 719.7     Patient Active Problem List   Diagnosis    AMS (altered mental status)    Essential hypertension    Hypothyroidism (acquired)    Morbid obesity     Past Medical History:   Diagnosis Date    Abnormal Pap smear of cervix     CHF (congestive heart failure)     Depression     H/O blood clots     Heart beat abnormality     Hypertension     Reflux gastritis     Sleep apnea     Thyroid cancer      Past Surgical History:   Procedure Laterality Date    CHOLECYSTECTOMY      ENDOMETRIAL ABLATION      FOOT GANGLION EXCISION Right     GASTRIC FUNDOPLICATION      TUBAL ABDOMINAL LIGATION       PT Assessment (Last 12 Hours)       PT Evaluation and Treatment       Row Name 24 1453          Physical Therapy Time and Intention    Document Type evaluation (P)   -IF     Mode of Treatment individual therapy;physical therapy (P)   -IF     Patient Effort good (P)   -IF       Row Name 24 1453          General Information    Patient Profile Reviewed yes (P)   -IF     Patient Observations alert;cooperative;agree to therapy (P)   -IF     Prior Level of Function independent:;all household mobility;gait;transfer;bed mobility;ADL's (P)   -IF     Equipment Currently Used at Home none (P)   -IF       Row Name 24 1453          Living Environment    Current Living Arrangements home (P)   -IF     Home Accessibility stairs to enter home (P)   -IF     People in Home spouse (P)   -IF     Primary Care Provided by self (P)   -IF       Row Name 24 1453          Home Main Entrance    Number of Stairs, Main Entrance three (P)   -IF       Row Name 24 1453          Home Use of Assistive/Adaptive Equipment    Equipment  Currently Used at Home none (P)   -IF       [facility] Name 11/04/24 1453          Cognition    Orientation Status (Cognition) oriented x 4 (P)   -IF       [facility] Name 11/04/24 1453          Range of Motion (ROM)    Range of Motion ROM is WFL;bilateral lower extremities (P)   -IF       Row Name 11/04/24 1453          Strength (Manual Muscle Testing)    Strength (Manual Muscle Testing) bilateral lower extremities (P)   BLE: 4/5  -IF       Row Name 11/04/24 1453          Bed Mobility    Bed Mobility supine-sit (P)   -IF     Supine-Sit [place] (Bed Mobility) standby assist (P)   -IF     Assistive Device (Bed Mobility) head of bed elevated;bed rails (P)   -IF       Row Name 11/04/24 1453          Transfers    Transfers sit-stand transfer (P)   -IF       Row Name 11/04/24 1453          Sit-Stand Transfer    Sit-Stand [place] (Transfers) contact guard (P)   -IF     Assistive Device (Sit-Stand Transfers) walker, front-wheeled (P)   -IF       Row Name 11/04/24 1453          Gait/Stairs (Locomotion)    Gait/Stairs Locomotion gait/ambulation assistive device (P)   -IF     [place] Level (Gait) contact guard (P)   -IF     Assistive Device (Gait) walker, front-wheeled (P)   -IF     Patient was able to Ambulate yes (P)   -IF     Distance in Feet (Gait) 50 (P)   -IF       Row Name 11/04/24 1453          Safety Issues/Impairments Affecting Functional Mobility    Impairments Affecting Function (Mobility) balance;coordination;endurance/activity tolerance;strength (P)   -IF       Row Name 11/04/24 1453          Balance    Balance Assessment standing dynamic balance (P)   -IF     Dynamic Standing Balance contact guard (P)   -IF     Position/Device Used, Standing Balance walker, front-wheeled (P)   -IF       Row Name 11/04/24 1453          Plan of Care Review    Plan of Care Reviewed With patient (P)   -IF     Outcome Evaluation Pt presents with deficits in strength, mobility, and ambulation deficits affecting her functional  abilities. Skilled PT is indicated at this time. Recommend Inpatient rehab upon discharge from hospital. (P)   -IF       Row Name 11/04/24 1453          Positioning and Restraints    Pre-Treatment Position in bed (P)   -IF     Post Treatment Position bed (P)   -IF     In Bed call light within reach;encouraged to call for assist;exit alarm on (P)   -IF       Row Name 11/04/24 1453          Therapy Assessment/Plan (PT)    Rehab Potential (PT) good (P)   -IF     Criteria for Skilled Interventions Met (PT) yes;meets criteria (P)   -IF     Therapy Frequency (PT) daily (P)   -IF     Predicted Duration of Therapy Intervention (PT) 10 days (P)   -IF     Problem List (PT) problems related to;mobility;balance;coordination;strength (P)   -IF     Activity Limitations Related to Problem List (PT) unable to ambulate safely (P)   -IF       Row Name 11/04/24 1453          PT Evaluation Complexity    History, PT Evaluation Complexity no personal factors and/or comorbidities (P)   -IF     Examination of Body Systems (PT Eval Complexity) total of 4 or more elements (P)   -IF     Clinical Presentation (PT Evaluation Complexity) stable (P)   -IF     Clinical Decision Making (PT Evaluation Complexity) low complexity (P)   -IF     Overall Complexity (PT Evaluation Complexity) low complexity (P)   -IF       Row Name 11/04/24 1453          Therapy Plan Review/Discharge Plan (PT)    Therapy Plan Review (PT) evaluation/treatment results reviewed;care plan/treatment goals reviewed;patient (P)   -IF       Row Name 11/04/24 1453          Physical Therapy Goals    Transfer Goal Selection (PT) transfer, PT goal 1 (P)   -IF     Gait Training Goal Selection (PT) gait training, PT goal 1 (P)   -IF       Row Name 11/04/24 1453          Transfer Goal 1 (PT)    Activity/Assistive Device (Transfer Goal 1, PT) sit-to-stand/stand-to-sit (P)   -IF     [place] Level/Cues Needed (Transfer Goal 1, PT) independent (P)   -IF     Time Frame (Transfer Goal 1,  PT) 10 days (P)   -IF       Row Name 11/04/24 1453          Gait Training Goal 1 (PT)    Activity/Assistive Device (Gait Training Goal 1, PT) gait (walking locomotion);increase endurance/gait distance (P)   -IF     [place] Level (Gait Training Goal 1, PT) independent (P)   -IF     Distance (Gait Training Goal 1, PT) 200 (P)   -IF     Time Frame (Gait Training Goal 1, PT) 10 days (P)   -IF               User Key  (r) = Recorded By, (t) = Taken By, (c) = Cosigned By      Initials Name Provider Type    IF Nydia Adams, PT Student PT Student                    Physical Therapy Education        No education to display                  PT Recommendation and Plan  Anticipated Discharge Disposition (PT): (P) inpatient rehabilitation facility  Planned Therapy Interventions (PT): (P) balance training, gait training, bed mobility training, neuromuscular re-education, strengthening  Therapy Frequency (PT): (P) daily  Plan of Care Reviewed With: (P) patient  Outcome Evaluation: (P) Pt presents with deficits in strength, mobility, and ambulation deficits affecting her functional abilities. Skilled PT is indicated at this time. Recommend Inpatient rehab upon discharge from hospital.   Outcome Measures       Row Name 11/04/24 1501             How much help from another person do you currently need...    Turning from your back to your side while in flat bed without using bedrails? 4 (P)   -IF      Moving from lying on back to sitting on the side of a flat bed without bedrails? 4 (P)   -IF      Moving to and from a bed to a chair (including a wheelchair)? 3 (P)   -IF      Standing up from a chair using your arms (e.g., wheelchair, bedside chair)? 3 (P)   -IF      Climbing 3-5 steps with a railing? 3 (P)   -IF      To walk in hospital room? 3 (P)   -IF      AM-PAC 6 Clicks Score (PT) 20 (P)   -IF                User Key  (r) = Recorded By, (t) = Taken By, (c) = Cosigned By      Initials Name Provider Type    IF Angie  Ndyia PT Student PT Student                     Time Calculation:    PT Charges       Row Name 11/04/24 1503             Time Calculation    PT Received On 11/04/24 (P)   -IF      PT Goal Re-Cert Due Date 11/13/24 (P)   -IF         Untimed Charges    PT Eval/Re-eval Minutes 35 (P)   -IF         Total Minutes    Untimed Charges Total Minutes 35 (P)   -IF       Total Minutes 35 (P)   -IF                User Key  (r) = Recorded By, (t) = Taken By, (c) = Cosigned By      Initials Name Provider Type    IF Nydia Adams, PT Student PT Student                  Therapy Charges for Today       Code Description Service Date Service Provider Modifiers Qty    [id number] HC PT EVAL LOW COMPLEXITY 2 11/4/2024 Nydia Adams PT Student GP 1            PT G-Codes  AM-PAC 6 Clicks Score (PT): (P) 20    Nydia Adams PT Student  11/4/2024

## 2024-11-04 NOTE — H&P
[facility]   HISTORY AND PHYSICAL    Patient Name: Brittny HERNÁNDEZ  : 1975  MRN: [id number]  Primary Care Physician:  Harshakumar, Sreedevi Pallath, MD  Date of admission: 11/3/2024    Subjective   Subjective     Chief Complaint: Altered mental status, lethargy    HPI:    Brittny HERNÁNDEZ is a 49 y.o. female with past medical history of hypertension, hypothyroidism, MANUELA, morbid obesity and GERD presenting to the ED for evaluation of altered mental status, and lethargy.  Family was worried because the patient was sleeping most of the day yesterday and would immediately go back to sleep when awakened.  When she finally stayed up she was confused, weak, and with some slurred speech so she was brought to the ED for further evaluation.  In the ED patient's vitals were all within normal limits on arrival.  Labs show that she had a TSH of 0.01 but with normal T4 and mild hyperglycemia.  Her drug screen was positive for benzodiazepine and THC-patient does not have prescriptions for either.  CT of the head was negative for any acute findings as well as a chest x-ray.  When seen patient was awake but slow to answer questions.  When asked she denied any recent fevers, chills, headaches, focal weakness, chest pain, palpitation, shortness of breath, cough, abdominal pain, nausea, vomiting, diarrhea, constipation, dysuria, hematuria, hematochezia, melena, or anxiety.  Patient admitted for further evaluation and treatment.        Review of Systems   All systems were reviewed and negative except for: As per HPI    Personal History     Past Medical History:   Diagnosis Date    Abnormal Pap smear of cervix     CHF (congestive heart failure)     Depression     H/O blood clots     Heart beat abnormality     Hypertension     Reflux gastritis     Sleep apnea     Thyroid cancer        Past Surgical History:   Procedure Laterality Date    CHOLECYSTECTOMY      ENDOMETRIAL ABLATION      FOOT GANGLION EXCISION Right     GASTRIC  FUNDOPLICATION      TUBAL ABDOMINAL LIGATION         Family History: family history includes Arthritis in her mother; Breast cancer in her maternal grandmother and paternal grandmother; Heart disease in her maternal grandfather and paternal grandfather; Hyperlipidemia in her mother; Hypertension in her mother; Ovarian cancer in her paternal grandmother. Otherwise pertinent FHx was reviewed and not pertinent to current issue.    Social History:  reports that she has never smoked. She does not have any smokeless tobacco history on file. She reports current alcohol use of about 2.0 standard drinks of alcohol per week. She reports current drug use. Drug: Marijuana.    Home Medications:  Atogepant, DULoxetine, diclofenac, digoxin, dilTIAZem, doxycycline, hydrOXYzine, lansoprazole, levothyroxine, metoprolol succinate XL, pravastatin, and vitamin D      Allergies:  Allergies   Allergen Reactions    Codeine     Demerol [Meperidine]     Percocet [Oxycodone-Acetaminophen]        Objective   Objective     Vitals:   Temp:  [97.5 °F (36.4 °C)-97.9 °F (36.6 °C)] 97.9 °F (36.6 °C)  Heart Rate:  [65-78] 71  Resp:  [15-16] 16  BP: (110-120)/(62-85) 113/71  Physical Exam    Constitutional: Awake, slow to answer,   Eyes: PERRLA, sclerae anicteric, no conjunctival injection   HENT: NCAT, mucous membranes moist   Neck: Supple, no thyromegaly, no lymphadenopathy, trachea midline   Respiratory: Clear to auscultation bilaterally, nonlabored respirations    Cardiovascular: RRR, no murmurs, rubs, or gallops, palpable pedal pulses bilaterally   Gastrointestinal: Positive bowel sounds, soft, nontender, nondistended   Musculoskeletal: No bilateral ankle edema, no clubbing or cyanosis to extremities   Psychiatric: Appropriate affect, cooperative   Neurologic: Oriented x 3, strength symmetric in all extremities, Cranial Nerves grossly intact to confrontation, speech clear   Skin: No rashes     Result Review    Result Review:  I have personally  reviewed the results from the time of this admission to 11/4/2024 03:16 EST and agree with these findings:  [x]  Laboratory list / accordion  []  Microbiology  [x]  Radiology  [x]  EKG/Telemetry   []  Cardiology/Vascular   []  Pathology  []  Old records  []  Other:  Most notable findings include: Decreased TSH with normal T4, drug screen positive for benzodiazepines and THC, CT head negative, chest x-ray negative      Assessment & Plan   Assessment / Plan     Brief Patient Summary:  Brittny HERNÁNDEZ is a 49 y.o. female with past medical history of hypertension, hypothyroidism, MANUELA, morbid obesity and GERD presenting to the ED for evaluation of altered mental status, and lethargy.    Active Hospital Problems:  Active Hospital Problems    Diagnosis     **AMS (altered mental status)     Essential hypertension     Hypothyroidism (acquired)     Morbid obesity      Plan:     Altered mental status/generalized weakness  -Admit to medical floor  -CT head unremarkable  -TSH 0.01 with normal T4  -Likely secondary to polypharmacy/illicit drug use  -IVF  -Fall precautions  -PT  -Further imaging and workup as needed  -Supportive care    HTN  -Currently well controlled  -PRN BP meds  -Resume home meds when available  -Titrate if needed    Hypothyroidism  -TSH was 0.01.  Normal T4  -Adjust meds as needed  -Outpatient follow-up    MANUELA  Morbid obesity  Depression    GI ppx  DVT ppx      VTE Prophylaxis:  Pharmacologic VTE prophylaxis orders are present.        CODE STATUS:    Level Of Support Discussed With: Patient  Code Status (Patient has no pulse and is not breathing): CPR (Attempt to Resuscitate)  Medical Interventions (Patient has pulse or is breathing): Full Support    Admission Status:  I believe this patient meets observation status.      Electronically signed by Darryl Chong MD, 11/04/24, 3:16 AM EST.

## 2024-11-04 NOTE — PROGRESS NOTES
[facility]   Hospitalist Progress Note  Date: 2024  Patient Name: Brittny HERNÁNDEZ  : 1975  MRN: [id number]  Date of admission: 11/3/2024  Room/Bed: 228/1      Subjective   Subjective     Chief Complaint:   Altered mental status, lethargy    Summary:  Brittny HERNÁNDEZ is a 49 y.o. female with medical history of hypertension, hypothyroidism, MANUELA, morbid obesity and GERD presenting to the ED for evaluation of altered mental status, and lethargy.  Family was worried because the patient was sleeping most of the day on the  and would immediately go back to sleep when awakened.  When she finally stayed up she was confused, weak, and with some slurred speech so she was brought to the ED for further evaluation.  In the ED patient's vitals were all within normal limits on arrival.  Labs show that she had a TSH of 0.01 but with normal T4 and mild hyperglycemia.  Her drug screen was positive for benzodiazepine and THC-patient does not have prescriptions for either.  CT of the head was negative for any acute findings as well as a chest x-ray.  When seen patient was awake but slow to answer questions.  When asked she denied any recent fevers, chills, headaches, focal weakness, chest pain, palpitation, shortness of breath, cough, abdominal pain, nausea, vomiting, diarrhea, constipation, dysuria, hematuria, hematochezia, melena, or anxiety.  Patient admitted for further evaluation and treatment.     Interval Followup:   Patient awake and alert on rounds this morning.  Alert and oriented x 3, however remains slow to responses.   at bedside continues to states she is not back to her baseline.  Patient does have a history of migraines, was recently referred to a neurologist however has not following up with a neurologist at this time.  Patient states she takes Tylenol intermittently to help with her headaches.  Patient's UDS was discussed.  Patient states she only intermittently uses THC, stating its delta 9  she uses, had not been recently.  Patient also denies using benzodiazepines recently.  Does endorse increased stress at work over the past week.  Neurology was consulted today.  An MRI was ordered.      Objective   Objective     Vitals:   Temp:  [97.3 °F (36.3 °C)-97.9 °F (36.6 °C)] 97.6 °F (36.4 °C)  Heart Rate:  [58-78] 60  Resp:  [15-16] 16  BP: (110-141)/(51-85) 112/65    Physical Exam               Constitutional: Awake, alert and oriented x 3.  Slow to respond to questions              Eyes: PERRLA, sclerae anicteric, no conjunctival injection              HENT: NCAT, mucous membranes moist              Neck: Supple, no thyromegaly, no lymphadenopathy, trachea midline              Respiratory: Clear to auscultation bilaterally, nonlabored respirations               Cardiovascular: RRR, no murmurs, rubs, or gallops, palpable pedal pulses bilaterally              Gastrointestinal: Positive bowel sounds, soft, nontender, nondistended              Musculoskeletal: No bilateral ankle edema, no clubbing or cyanosis to extremities              Neurologic: Oriented x 3, strength symmetric in all extremities, Cranial Nerves grossly intact to confrontation, speech clear.  No clonus              Skin: No rashes     Result Review    Result Review:  I have personally reviewed these results:  [x]  Laboratory      Lab 11/03/24 2125   WBC 10.19   HEMOGLOBIN 12.6   HEMATOCRIT 37.5   PLATELETS 272   NEUTROS ABS 7.12*   IMMATURE GRANS (ABS) 0.04   LYMPHS ABS 2.13   MONOS ABS 0.65   EOS ABS 0.20   MCV 85.0   LACTATE 1.1         Lab 11/03/24 2125   SODIUM 140   POTASSIUM 4.0   CHLORIDE 103   CO2 27.8   ANION GAP 9.2   BUN 11   CREATININE 0.80   EGFR 89.9   GLUCOSE 155*   CALCIUM 8.8   TSH 0.010*         Lab 11/03/24 2125   TOTAL PROTEIN 6.5   ALBUMIN 3.4*   GLOBULIN 3.1   ALT (SGPT) 13   AST (SGOT) 15   BILIRUBIN 0.4   ALK PHOS 80         Lab 11/04/24  0003 11/03/24 2125   PROBNP  --  <36.0   HSTROP T <6 <6                 Lab  11/04/24  0811   PH, ARTERIAL 7.447   PCO2, ARTERIAL 40.7   PO2 ART 76.2*   O2 SATURATION ART 95.7   FIO2 21   HCO3 ART 28.1*   BASE EXCESS ART 3.7*     Brief Urine Lab Results  (Last result in the past 365 days)        Color   Clarity   Blood   Leuk Est   Nitrite   Protein   CREAT   Urine HCG        11/03/24 2214               Negative       11/03/24 2214 Yellow   Clear   Negative   Negative   Negative   Negative                 [x]  Microbiology   Microbiology Results (last 10 days)       Procedure Component Value - Date/Time    COVID-19, FLU A/B, RSV PCR 1 HR TAT - Swab, Nasopharynx [[id number]]  (Normal) Collected: 11/03/24 2043    Lab Status: Final result Specimen: Swab from Nasopharynx Updated: 11/03/24 2126     COVID19 Not Detected     Influenza A PCR Not Detected     Influenza B PCR Not Detected     RSV, PCR Not Detected    Narrative:      Fact sheet for providers: https://www.fda.gov/media/146701/download    Fact sheet for patients: https://www.fda.gov/media/242214/download    Test performed by PCR.          [x]  Radiology  XR Chest 1 View    Result Date: 11/3/2024  No acute cardiopulmonary disease is seen radiographically. Please see above comments for further detail.    Portions of this note were completed with a voice recognition program.  Electronically Signed ByDelroy Crzu MD On:11/3/2024 9:42 PM      CT Head Without Contrast    Result Date: 11/3/2024  No acute brain abnormality is seen. Specifically, no acute intracranial hemorrhage, no acute infarct, no significant intracranial mass lesion, and no acute intracranial mass effect are appreciated.    Portions of this note were completed with a voice recognition program.  Electronically Signed ByDelroy Cruz MD On:11/3/2024 9:28 PM     []  EKG/Telemetry   []  Cardiology/Vascular   []  Pathology  []  Old records  []  Other:    Assessment & Plan   Assessment / Plan     Assessment:  Altered mental status  UDS positive for THC/benzodiazepines  History  of migraines  History of hypertension  History of hypothyroidism  History of sick sinus syndrome status post pacemaker placement  MANUELA    Plan:  Patient remains admitted to the hospital for further care management  Will consult neurology today, appreciate assistance  CT reviewed, ordering MRI  Patient does not appear to have benzodiazepines on her home med list, does not appear to routinely take these medications.  However we will need to watch out for withdrawal symptoms.  All medications otherwise reviewed and resumed as indicated       Discussed with RN.    VTE Prophylaxis:  Pharmacologic VTE prophylaxis orders are present.        CODE STATUS:   Level Of Support Discussed With: Patient  Code Status (Patient has no pulse and is not breathing): CPR (Attempt to Resuscitate)  Medical Interventions (Patient has pulse or is breathing): Full Support      Electronically signed by Mamadou Healy MD, 11/4/2024, 18:41 EST.

## 2024-11-04 NOTE — NURSING NOTE
Patient experienced unwitnessed fall at 1224 this afternoon. Patient called out to use the restroom via call light. PCA went to assist patient to the restroom. Spouse and daughters were at bedside. Gait belt was applied to patient and walker was used. PCA assist patient to the bathroom as x1 assist . Patient was helped onto the toilet by the PCA. PCA told patient she would give her privacy and would crack the door and be on the other side. PCA heard loud noise and opened the door completely and found patient on her knees on the floor. Patient stated she did not hit her head. Patient was helped up and back to bed by PCA. Primary nurse was notified and fall policy was activated. MD, ,and  were made aware.

## 2024-11-04 NOTE — CONSULTS
[facility] Tele[facility] Consult [facility]    Date of Service 11/4/2024    Impression:   hypoxemia  Benzodiazepine and THC use  Altered mental status, likely due to the above  Sick sinus syndrome and SVTs, has pacemaker       Recommendations:   (Primary Team to order Controlled Medications)    1- Toxic metabolic encephalopathy workup, Supportive and symptomatic treatment, electrolyte management and antibiotics per primary team.  Team to address low TSH    2- Delirium precautions:  Blinds open during the day, closed at night, frequent reorientation, minimize nighttime interruptions, when possible avoid benzodiazepines (except with CIWA protocol), opioid pain medications, anticholinergic medications, and other sedative medications.  If patient gets agitated, try verbal de-escalation first.     3- if mental status does not improve by 11/5/24 then EEG is recommended.     4- Brain MRI pending           [facility] Neurologist will follow up with results.  Please contact [facility] Navigator to reach me if further questions/concerns arise.    ---------------------------------------------------------------------    Reason for Neurology Consult: - altered mental status    History of Present Illness:   -  Patient is a(n) 49 years old female with past medical history of hypertension, hypothyroidism, Obstructive Sleep Apnea, morbid obesity and GERD presenting to the ED for evaluation of altered mental status, and lethargy.     Found to have THC and Benzodiazepine in UDS (no prescription for such)      Examination:   Mental Status:   Lethargic, oriented to self, month, place, for year stated 84    Speech: fluent    Cranial Nerves:   Extraocular movements: Intact in all cardinal gaze  Ptosis: Absent  Facial movements: Intact and symmetric      Motor Exam:   No drift in arms       Coordination:    Finger to nose: Intact      Patient / Family was informed the Neurology Consult would occur via TeleHealth consult by way  of interactive audio and video telecommunications and consented to receiving care in this manner.     Patient is being evaluated for possible acute neurologic impairment and high probability of imminent or life - threatening deterioration. I spent total of 17 minutes providing care to this patient, including time for face to face visit via telemedicine, review of medical records, imaging studies and discussion of findings with providers, the patient and / or family.        Dr Vahid Behravan        [facility]  For Inpatient follow-up with [facility] physician please call [facility] [phone]. This is not an outpatient service. Post hospital discharge, please contact hospital directly.     Please do not communicate with [facility] physicians via secure chat. If you have any questions, Please contact [facility].  Please call or reconsult our service if there are any clinical or diagnostic changes.

## 2024-11-04 NOTE — PLAN OF CARE
Goal Outcome Evaluation:           Progress: no change  Outcome Evaluation: Patient is alert to person and place, disoriented to time and situation. Slow to respond. Can mostly carry conversation that is appropriate. Impulsive behaviors. Educated to use call light and to not get out of bed by herself. Fell at 1224 in her bathroom, see note and documentation. On room air. Assist x1 with walker and gait belt. Lung sounds clear. On FLEX monitor, normal sinus rhythm. Last BM on 11/2. Abdomen positive, soft, nontender.  at bedside.    Sandrine Gan RN

## 2024-11-05 ENCOUNTER — APPOINTMENT (OUTPATIENT)
Dept: MRI IMAGING | Facility: HOSPITAL | Age: 49
DRG: 101 | End: 2024-11-05
Payer: COMMERCIAL

## 2024-11-05 LAB
ALBUMIN SERPL-MCNC: 3.8 G/DL (ref 3.5–5.2)
ALBUMIN/GLOB SERPL: 1 G/DL
ALP SERPL-CCNC: 88 U/L (ref 39–117)
ALT SERPL W P-5'-P-CCNC: 16 U/L (ref 1–33)
ANION GAP SERPL CALCULATED.3IONS-SCNC: 11.2 MMOL/L (ref 5–15)
AST SERPL-CCNC: 16 U/L (ref 1–32)
BILIRUB SERPL-MCNC: 0.6 MG/DL (ref 0–1.2)
BUN SERPL-MCNC: 11 MG/DL (ref 6–20)
BUN/CREAT SERPL: 13.9 (ref 7–25)
CALCIUM SPEC-SCNC: 9.2 MG/DL (ref 8.6–10.5)
CHLORIDE SERPL-SCNC: 99 MMOL/L (ref 98–107)
CO2 SERPL-SCNC: 24.8 MMOL/L (ref 22–29)
CREAT SERPL-MCNC: 0.79 MG/DL (ref 0.57–1)
DEPRECATED RDW RBC AUTO: 36.9 FL (ref 37–54)
EGFRCR SERPLBLD CKD-EPI 2021: 91.3 ML/MIN/1.73
ERYTHROCYTE [DISTWIDTH] IN BLOOD BY AUTOMATED COUNT: 12.1 % (ref 12.3–15.4)
GLOBULIN UR ELPH-MCNC: 3.7 GM/DL
GLUCOSE SERPL-MCNC: 149 MG/DL (ref 65–99)
HCT VFR BLD AUTO: 41.6 % (ref 34–46.6)
HGB BLD-MCNC: 14 G/DL (ref 12–15.9)
MAGNESIUM SERPL-MCNC: 1.8 MG/DL (ref 1.6–2.6)
MCH RBC QN AUTO: 28.5 PG (ref 26.6–33)
MCHC RBC AUTO-ENTMCNC: 33.7 G/DL (ref 31.5–35.7)
MCV RBC AUTO: 84.6 FL (ref 79–97)
PLATELET # BLD AUTO: 284 10*3/MM3 (ref 140–450)
PMV BLD AUTO: 8.3 FL (ref 6–12)
POTASSIUM SERPL-SCNC: 4.1 MMOL/L (ref 3.5–5.2)
PROT SERPL-MCNC: 7.5 G/DL (ref 6–8.5)
RBC # BLD AUTO: 4.92 10*6/MM3 (ref 3.77–5.28)
SODIUM SERPL-SCNC: 135 MMOL/L (ref 136–145)
WBC NRBC COR # BLD AUTO: 9.27 10*3/MM3 (ref 3.4–10.8)

## 2024-11-05 PROCEDURE — 85027 COMPLETE CBC AUTOMATED: CPT | Performed by: FAMILY MEDICINE

## 2024-11-05 PROCEDURE — 99232 SBSQ HOSP IP/OBS MODERATE 35: CPT | Performed by: INTERNAL MEDICINE

## 2024-11-05 PROCEDURE — 99231 SBSQ HOSP IP/OBS SF/LOW 25: CPT | Performed by: PSYCHIATRY & NEUROLOGY

## 2024-11-05 PROCEDURE — 83735 ASSAY OF MAGNESIUM: CPT | Performed by: INTERNAL MEDICINE

## 2024-11-05 PROCEDURE — 70551 MRI BRAIN STEM W/O DYE: CPT

## 2024-11-05 PROCEDURE — 25010000002 ENOXAPARIN PER 10 MG: Performed by: FAMILY MEDICINE

## 2024-11-05 PROCEDURE — 80053 COMPREHEN METABOLIC PANEL: CPT | Performed by: INTERNAL MEDICINE

## 2024-11-05 RX ADMIN — DILTIAZEM HYDROCHLORIDE 240 MG: 240 CAPSULE, EXTENDED RELEASE ORAL at 08:26

## 2024-11-05 RX ADMIN — Medication 10 ML: at 21:54

## 2024-11-05 RX ADMIN — DIGOXIN 250 MCG: 0.12 TABLET ORAL at 08:25

## 2024-11-05 RX ADMIN — ENOXAPARIN SODIUM 40 MG: 100 INJECTION SUBCUTANEOUS at 21:53

## 2024-11-05 RX ADMIN — METOPROLOL SUCCINATE 50 MG: 50 TABLET, EXTENDED RELEASE ORAL at 21:53

## 2024-11-05 RX ADMIN — DULOXETINE HYDROCHLORIDE 60 MG: 30 CAPSULE, DELAYED RELEASE ORAL at 08:27

## 2024-11-05 RX ADMIN — DULOXETINE HYDROCHLORIDE 30 MG: 30 CAPSULE, DELAYED RELEASE ORAL at 08:25

## 2024-11-05 RX ADMIN — ENOXAPARIN SODIUM 40 MG: 100 INJECTION SUBCUTANEOUS at 08:25

## 2024-11-05 RX ADMIN — PRAVASTATIN SODIUM 40 MG: 20 TABLET ORAL at 21:53

## 2024-11-05 RX ADMIN — Medication 10 ML: at 08:26

## 2024-11-05 RX ADMIN — METOPROLOL SUCCINATE 50 MG: 50 TABLET, EXTENDED RELEASE ORAL at 08:26

## 2024-11-05 NOTE — PROGRESS NOTES
[facility]   Hospitalist Progress Note  Date: 2024  Patient Name: Brittny HERNÁNDEZ  : 1975  MRN: [id number]  Date of admission: 11/3/2024  Room/Bed: 228/1      Subjective   Subjective     Chief Complaint:   Altered mental status, lethargy    Summary:  Brittny HERNÁNDEZ is a 49 y.o. female with medical history of hypertension, hypothyroidism, MANUELA, morbid obesity and GERD presenting to the ED for evaluation of altered mental status, and lethargy.  Family was worried because the patient was sleeping most of the day on the  and would immediately go back to sleep when awakened.  When she finally stayed up she was confused, weak, and with some slurred speech so she was brought to the ED for further evaluation.  In the ED patient's vitals were all within normal limits on arrival.  Labs show that she had a TSH of 0.01 but with normal T4 and mild hyperglycemia.  Her drug screen was positive for benzodiazepine and THC-patient does not have prescriptions for either.  CT of the head was negative for any acute findings as well as a chest x-ray.  When seen patient was awake but slow to answer questions.  When asked she denied any recent fevers, chills, headaches, focal weakness, chest pain, palpitation, shortness of breath, cough, abdominal pain, nausea, vomiting, diarrhea, constipation, dysuria, hematuria, hematochezia, melena, or anxiety.  Patient admitted for further evaluation and treatment.     Interval Followup:   Patient awake and alert on rounds this morning.  MRI brain negtive.  Has not used cpap for long time due to face mask problems.    Alert and oriented x 3, however remains slow to responses.   at bedside continues to states she is not back to her baseline.  Patient does have a history of migraines, was recently referred to a neurologist however has not following up with a neurologist at this time.  Patient states she takes Tylenol intermittently to help with her headaches.  Patient's UDS was  discussed.  Patient states she only intermittently uses THC, stating its delta 9 she uses, had not been recently.  Patient also denies using benzodiazepines recently.  Does endorse increased stress at work over the past week.  Neurology ordered EEG.      Objective   Objective     Vitals:   Temp:  [97.5 °F (36.4 °C)-98.2 °F (36.8 °C)] 98.1 °F (36.7 °C)  Heart Rate:  [59-65] 65  Resp:  [16-18] 16  BP: (104-123)/(61-84) 104/61    Physical Exam               Constitutional: Awake, alert and oriented x 3.  Slow to respond to questions              Eyes: PERRLA, sclerae anicteric, no conjunctival injection              HENT: NCAT, mucous membranes moist              Neck: Supple, no thyromegaly, no lymphadenopathy, trachea midline              Respiratory: Clear to auscultation bilaterally, nonlabored respirations               Cardiovascular: RRR, no murmurs, rubs, or gallops, palpable pedal pulses bilaterally              Gastrointestinal: Positive bowel sounds, soft, nontender, nondistended              Musculoskeletal: No bilateral ankle edema, no clubbing or cyanosis to extremities              Neurologic: Oriented x 3, strength symmetric in all extremities, Cranial Nerves grossly intact to confrontation, speech clear.  No clonus              Skin: No rashes     Result Review    Result Review:  I have personally reviewed these results:  [x]  Laboratory      Lab 11/05/24 0412 11/03/24 2125   WBC 9.27 10.19   HEMOGLOBIN 14.0 12.6   HEMATOCRIT 41.6 37.5   PLATELETS 284 272   NEUTROS ABS  --  7.12*   IMMATURE GRANS (ABS)  --  0.04   LYMPHS ABS  --  2.13   MONOS ABS  --  0.65   EOS ABS  --  0.20   MCV 84.6 85.0   LACTATE  --  1.1         Lab 11/05/24  0743 11/03/24 2125   SODIUM 135* 140   POTASSIUM 4.1 4.0   CHLORIDE 99 103   CO2 24.8 27.8   ANION GAP 11.2 9.2   BUN 11 11   CREATININE 0.79 0.80   EGFR 91.3 89.9   GLUCOSE 149* 155*   CALCIUM 9.2 8.8   MAGNESIUM 1.8  --    TSH  --  0.010*         Lab 11/05/24 0743  11/03/24 2125   TOTAL PROTEIN 7.5 6.5   ALBUMIN 3.8 3.4*   GLOBULIN 3.7 3.1   ALT (SGPT) 16 13   AST (SGOT) 16 15   BILIRUBIN 0.6 0.4   ALK PHOS 88 80         Lab 11/04/24  0003 11/03/24 2125   PROBNP  --  <36.0   HSTROP T <6 <6                 Lab 11/04/24  0811   PH, ARTERIAL 7.447   PCO2, ARTERIAL 40.7   PO2 ART 76.2*   O2 SATURATION ART 95.7   FIO2 21   HCO3 ART 28.1*   BASE EXCESS ART 3.7*     Brief Urine Lab Results  (Last result in the past 365 days)        Color   Clarity   Blood   Leuk Est   Nitrite   Protein   CREAT   Urine HCG        11/03/24 2214               Negative       11/03/24 2214 Yellow   Clear   Negative   Negative   Negative   Negative                 [x]  Microbiology   Microbiology Results (last 10 days)       Procedure Component Value - Date/Time    Blood Culture - Blood, Arm, Left [[id number]]  (Normal) Collected: 11/03/24 2128    Lab Status: Preliminary result Specimen: Blood from Arm, Left Updated: 11/04/24 2146     Blood Culture No growth at 24 hours    Blood Culture - Blood, Arm, Left [[id number]]  (Abnormal) Collected: 11/03/24 2125    Lab Status: Preliminary result Specimen: Blood from Arm, Left Updated: 11/05/24 0722     Blood Culture Growth present, too young to evaluate     Gram Stain Aerobic Bottle Gram positive cocci in clusters    Blood Culture ID, PCR - Blood, Arm, Left [[id number]]  (Abnormal) Collected: 11/03/24 2125    Lab Status: Final result Specimen: Blood from Arm, Left Updated: 11/04/24 2045     BCID, PCR Staph spp, not aureus or lugdunensis. Identification by BCID2 PCR.     BOTTLE TYPE Aerobic Bottle    COVID-19, FLU A/B, RSV PCR 1 HR TAT - Swab, Nasopharynx [[id number]]  (Normal) Collected: 11/03/24 2043    Lab Status: Final result Specimen: Swab from Nasopharynx Updated: 11/03/24 2126     COVID19 Not Detected     Influenza A PCR Not Detected     Influenza B PCR Not Detected     RSV, PCR Not Detected    Narrative:      Fact sheet for providers:  https://www.fda.gov/media/087286/download    Fact sheet for patients: https://www.fda.gov/media/175350/download    Test performed by PCR.          [x]  Radiology  MRI Brain Without Contrast    Result Date: 11/5/2024  1.No significant abnormality is identified within the brain. 2.No diffusion restriction is identified to suggest acute infarct. Electronically Signed: Chas Zabala MD  11/5/2024 9:22 AM EST  Workstation ID: [id number]    XR Chest 1 View    Result Date: 11/3/2024  No acute cardiopulmonary disease is seen radiographically. Please see above comments for further detail.    Portions of this note were completed with a voice recognition program.  Electronically Signed By-Husam Cruz MD On:11/3/2024 9:42 PM      CT Head Without Contrast    Result Date: 11/3/2024  No acute brain abnormality is seen. Specifically, no acute intracranial hemorrhage, no acute infarct, no significant intracranial mass lesion, and no acute intracranial mass effect are appreciated.    Portions of this note were completed with a voice recognition program.  Electronically Signed By-Husam Cruz MD On:11/3/2024 9:28 PM     []  EKG/Telemetry   []  Cardiology/Vascular   []  Pathology  []  Old records  []  Other:    Assessment & Plan   Assessment / Plan     Assessment:  Altered mental status;Improving  UDS positive for THC/benzodiazepines;pt denies taking drugs  History of migraines  History of hypertension  History of hypothyroidism  History of sick sinus syndrome status post pacemaker placement  MANUELA;non compliant with cpap.  Obesity bmi of 41.    Plan:  Patient remains admitted to the hospital for further care management  Neuro consulted, appreciate assistance;EEG oredered.  CT reviewed,  MRI of brain noted;nothing acute  Patient does not appear to have benzodiazepines on her home med list, does not appear to routinely take these medications.  However we will need to watch out for withdrawal symptoms.  All medications otherwise  reviewed and resumed as indicated       Discussed with RN.    VTE Prophylaxis:  Pharmacologic VTE prophylaxis orders are present.        CODE STATUS:   Level Of Support Discussed With: Patient  Code Status (Patient has no pulse and is not breathing): CPR (Attempt to Resuscitate)  Medical Interventions (Patient has pulse or is breathing): Full Support      Electronically signed by Kai Rahman MD, 11/5/2024, 13:35 EST.

## 2024-11-05 NOTE — PLAN OF CARE
Goal Outcome Evaluation:   Patient has had no new changes throughout shift and continues to remain stable. Patient remains alert and oriented x2-3 during shift. This is not patient's baseline. Patient remains slow to respond. Patient had MRI performed this morning. EEG ordered will be performed tomorrow. Patient is x1 assist to the bathroom. Patient has voided without difficulty. Patient's spouse remains at bedside. Patient has had no complaints of pain during shift.

## 2024-11-05 NOTE — PROGRESS NOTES
"[facility] Tele[facility]urology Progress Note    Date of Service 11/5/2024      Presentation:    Based on previous neurology note(s)   : \" Patient is a(n) 49 years old female with past medical history of hypertension, hypothyroidism, Obstructive Sleep Apnea, morbid obesity and GERD presenting to the ED for evaluation of altered mental status, and lethargy.      Found to have THC and Benzodiazepine in UDS (no prescription for such)      Interval history:        11/5/2024: more awake today.    Impression:    hypoxemia  Benzodiazepine and THC use  Altered mental status, likely due to the above  Sick sinus syndrome and SVTs, has pacemaker         Results and Recommendations:   (Primary Team to order Controlled Medications)     1- Toxic metabolic encephalopathy workup, Supportive and symptomatic treatment, electrolyte management and antibiotics per primary team.  Team to address low TSH     2- Delirium precautions:  Blinds open during the day, closed at night, frequent reorientation, minimize nighttime interruptions, when possible avoid benzodiazepines (except with CIWA protocol), opioid pain medications, anticholinergic medications, and other sedative medications.  If patient gets agitated, try verbal de-escalation first.      3- if mental status not back to baseline (albeit improved) ordered EEG 11/5      4- Brain MRI No Acute Intracranial Abnormality.         [facility] Neurologist will follow up with results.  Please contact [facility] Navigator to reach me if further questions/concerns arise.    Examination:    awake, alert, oriented to self, month, place, for year stated 94 “but I know its not right”  speech no aphasia  Extraocular movements intact  face symmetric  arms no drift (10s)  coordination intact in finger to nose    -----------------------------------------------------------  Patient / Family was informed the Neurology Consult would occur via TeleHealth consult by way of interactive audio and " [email or web address] and consented to receiving care in this manner.     Patient is being evaluated for possible acute neurologic impairment and high probability of imminent or life - threatening deterioration. I spent total of 11 minutes providing care to this patient, including time for face to face visit via telemedicine, review of medical records, imaging studies and discussion of findings with providers, the patient and / or family.        Dr Vahid Behravan        [facility]  For Inpatient follow-up with [facility] physician please call [facility] [phone]. This is not an outpatient service. Post hospital discharge, please contact hospital directly.     Please do not communicate with [facility] physicians via secure chat. If you have any questions, Please contact [facility].  Please call or reconsult our service if there are any clinical or diagnostic changes.

## 2024-11-06 ENCOUNTER — APPOINTMENT (OUTPATIENT)
Dept: NEUROLOGY | Facility: HOSPITAL | Age: 49
DRG: 101 | End: 2024-11-06
Payer: COMMERCIAL

## 2024-11-06 LAB
BACTERIA SPEC AEROBE CULT: ABNORMAL
GRAM STN SPEC: ABNORMAL
ISOLATED FROM: ABNORMAL

## 2024-11-06 PROCEDURE — 25010000002 KETOROLAC TROMETHAMINE PER 15 MG: Performed by: INTERNAL MEDICINE

## 2024-11-06 PROCEDURE — 99232 SBSQ HOSP IP/OBS MODERATE 35: CPT | Performed by: INTERNAL MEDICINE

## 2024-11-06 PROCEDURE — 25010000002 ENOXAPARIN PER 10 MG: Performed by: FAMILY MEDICINE

## 2024-11-06 PROCEDURE — 99231 SBSQ HOSP IP/OBS SF/LOW 25: CPT | Performed by: PSYCHIATRY & NEUROLOGY

## 2024-11-06 PROCEDURE — 95816 EEG AWAKE AND DROWSY: CPT

## 2024-11-06 RX ORDER — ACETAMINOPHEN 325 MG/1
650 TABLET ORAL EVERY 6 HOURS PRN
Status: DISCONTINUED | OUTPATIENT
Start: 2024-11-06 | End: 2024-11-08 | Stop reason: HOSPADM

## 2024-11-06 RX ORDER — KETOROLAC TROMETHAMINE 15 MG/ML
15 INJECTION, SOLUTION INTRAMUSCULAR; INTRAVENOUS EVERY 6 HOURS PRN
Status: DISCONTINUED | OUTPATIENT
Start: 2024-11-06 | End: 2024-11-08 | Stop reason: HOSPADM

## 2024-11-06 RX ADMIN — METOPROLOL SUCCINATE 50 MG: 50 TABLET, EXTENDED RELEASE ORAL at 09:23

## 2024-11-06 RX ADMIN — ENOXAPARIN SODIUM 40 MG: 100 INJECTION SUBCUTANEOUS at 21:42

## 2024-11-06 RX ADMIN — METOPROLOL SUCCINATE 50 MG: 50 TABLET, EXTENDED RELEASE ORAL at 21:41

## 2024-11-06 RX ADMIN — DULOXETINE HYDROCHLORIDE 60 MG: 30 CAPSULE, DELAYED RELEASE ORAL at 09:24

## 2024-11-06 RX ADMIN — KETOROLAC TROMETHAMINE 15 MG: 15 INJECTION, SOLUTION INTRAMUSCULAR; INTRAVENOUS at 15:32

## 2024-11-06 RX ADMIN — DIGOXIN 250 MCG: 0.12 TABLET ORAL at 09:24

## 2024-11-06 RX ADMIN — DULOXETINE HYDROCHLORIDE 30 MG: 30 CAPSULE, DELAYED RELEASE ORAL at 09:23

## 2024-11-06 RX ADMIN — DICLOFENAC SODIUM 2 G: 10 GEL TOPICAL at 17:48

## 2024-11-06 RX ADMIN — DICLOFENAC SODIUM 2 G: 10 GEL TOPICAL at 21:43

## 2024-11-06 RX ADMIN — KETOROLAC TROMETHAMINE 15 MG: 15 INJECTION, SOLUTION INTRAMUSCULAR; INTRAVENOUS at 21:42

## 2024-11-06 RX ADMIN — ENOXAPARIN SODIUM 40 MG: 100 INJECTION SUBCUTANEOUS at 09:22

## 2024-11-06 RX ADMIN — PRAVASTATIN SODIUM 40 MG: 20 TABLET ORAL at 21:41

## 2024-11-06 RX ADMIN — Medication 10 ML: at 09:24

## 2024-11-06 RX ADMIN — DILTIAZEM HYDROCHLORIDE 240 MG: 240 CAPSULE, EXTENDED RELEASE ORAL at 09:23

## 2024-11-06 RX ADMIN — Medication 10 ML: at 21:42

## 2024-11-06 NOTE — PLAN OF CARE
Goal Outcome Evaluation:  Plan of Care Reviewed With: patient        Progress: no change  Outcome Evaluation: Pt did not complain of pain during the shift. VSS. PT is up ad lizbeth. Pt is more alert and oriented. Pt is awaiting discharge planning.

## 2024-11-06 NOTE — PROGRESS NOTES
"[facility] Tele[facility]urology Progress Note    Date of Service 11/6/2024      Presentation:    Based on previous neurology note(s)   : \" Patient is a(n) 49 years old female with past medical history of hypertension, hypothyroidism, Obstructive Sleep Apnea, morbid obesity and GERD presenting to the ED for evaluation of altered mental status, and lethargy.      Found to have THC and Benzodiazepine in UDS (no prescription for such)        Interval history:           11/5/2024: more awake today.  11/6: mental status improving      Impression:     hypoxemia  Benzodiazepine and THC use  Altered mental status, likely due to the above  Sick sinus syndrome and SVTs, has pacemaker         Results and Recommendations:   (Primary Team to order Controlled Medications)     1- Toxic metabolic encephalopathy workup, Supportive and symptomatic treatment, electrolyte management and antibiotics per primary team.  Team to address low TSH     2- Delirium precautions:  Blinds open during the day, closed at night, frequent reorientation, minimize nighttime interruptions, when possible avoid benzodiazepines (except with CIWA protocol), opioid pain medications, anticholinergic medications, and other sedative medications.  If patient gets agitated, try verbal de-escalation first.      3- if mental status not back to baseline (albeit improved) ordered EEG 11/5 (pending)     4- Brain MRI No Acute Intracranial Abnormality.            [facility] Neurologist will follow up with results.   Please contact [facility] Navigator to reach me if further questions/concerns arise.     Examination:     awake, alert, oriented x3   speech no aphasia  Extraocular movements intact  face symmetric  arms no drift (10s)  coordination intact in finger to nose    -----------------------------------------------------------  Patient / Family was informed the Neurology Consult would occur via TeleHealth consult by way of interactive audio and video " telecommunications and consented to receiving care in this manner.     Patient is being evaluated for possible acute neurologic impairment and high probability of imminent or life - threatening deterioration. I spent total of 10 minutes providing care to this patient, including time for face to face visit via telemedicine, review of medical records, imaging studies and discussion of findings with providers, the patient and / or family.        Dr Vahid Behravan        [facility]  For Inpatient follow-up with [facility] physician please call [facility] [phone]. This is not an outpatient service. Post hospital discharge, please contact hospital directly.     Please do not communicate with [facility] physicians via secure chat. If you have any questions, Please contact [facility].  Please call or reconsult our service if there are any clinical or diagnostic changes.

## 2024-11-06 NOTE — PROGRESS NOTES
[facility]   Hospitalist Progress Note  Date: 2024  Patient Name: Brittny HERNÁNDEZ  : 1975  MRN: [id number]  Date of admission: 11/3/2024  Room/Bed: 228/1      Subjective   Subjective     Chief Complaint:   Altered mental status, lethargy    Summary:  Brittny HERNÁNDEZ is a 49 y.o. female with medical history of hypertension, hypothyroidism, MANUELA, morbid obesity and GERD presenting to the ED for evaluation of altered mental status, and lethargy.  Family was worried because the patient was sleeping most of the day on the  and would immediately go back to sleep when awakened.  When she finally stayed up she was confused, weak, and with some slurred speech so she was brought to the ED for further evaluation.  In the ED patient's vitals were all within normal limits on arrival.  Labs show that she had a TSH of 0.01 but with normal T4 and mild hyperglycemia.  Her drug screen was positive for benzodiazepine and THC-patient does not have prescriptions for either.  CT of the head was negative for any acute findings as well as a chest x-ray.  When seen patient was awake but slow to answer questions.  When asked she denied any recent fevers, chills, headaches, focal weakness, chest pain, palpitation, shortness of breath, cough, abdominal pain, nausea, vomiting, diarrhea, constipation, dysuria, hematuria, hematochezia, melena, or anxiety.  Patient admitted for further evaluation and treatment.  Blood culture 1 out of 2 is coagulase-negative staph likely contamination.  Does have pacemaker.     Interval Followup:   Patient awake and alert on rounds this morning.  MRI brain negtive.  Has not used cpap for long time due to face mask problems.  Complaining of bilateral hip pains going down into legs on the sides below knee.  Complaining of excessive tiredness fatigue and daytime sleepiness.  Denies any past history of fibromyalgia or autoimmune disorder    Alert and oriented x 3, however remains slow to responses.    at bedside continues to states she is not back to her baseline.  Patient does have a history of migraines, was recently referred to a neurologist however has not following up with a neurologist at this time.  Patient states she takes Tylenol intermittently to help with her headaches.  Patient's UDS was discussed.  Patient states she only intermittently uses THC, stating its delta 9 she uses, had not been recently.  Patient also denies using benzodiazepines recently.  Does endorse increased stress at work over the past week.  Neurology ordered EEG.      Objective   Objective     Vitals:   Temp:  [97.7 °F (36.5 °C)-98.2 °F (36.8 °C)] 97.7 °F (36.5 °C)  Heart Rate:  [59-64] 59  Resp:  [16-18] 18  BP: (105-122)/(65-73) 105/73    Physical Exam               Constitutional: Awake, alert and oriented x 3.  Slow to respond to questions              Eyes: PERRLA, sclerae anicteric, no conjunctival injection              HENT: NCAT, mucous membranes moist              Neck: Supple, no thyromegaly, no lymphadenopathy, trachea midline              Respiratory: Clear to auscultation bilaterally, nonlabored respirations               Cardiovascular: RRR, no murmurs, rubs, or gallops, palpable pedal pulses bilaterally              Gastrointestinal: Positive bowel sounds, soft, nontender, nondistended              Musculoskeletal: Positive tender bilateral greater trochanteric area, no bilateral ankle edema, no clubbing or cyanosis to extremities              Neurologic: Oriented x 3, strength symmetric in all extremities, Cranial Nerves grossly intact to confrontation, speech clear.  No clonus              Skin: No rashes     Result Review    Result Review:  I have personally reviewed these results:  [x]  Laboratory      Lab 11/05/24 0412 11/03/24 2125   WBC 9.27 10.19   HEMOGLOBIN 14.0 12.6   HEMATOCRIT 41.6 37.5   PLATELETS 284 272   NEUTROS ABS  --  7.12*   IMMATURE GRANS (ABS)  --  0.04   LYMPHS ABS  --  2.13   MONOS  ABS  --  0.65   EOS ABS  --  0.20   MCV 84.6 85.0   LACTATE  --  1.1         Lab 11/05/24  0743 11/03/24 2125   SODIUM 135* 140   POTASSIUM 4.1 4.0   CHLORIDE 99 103   CO2 24.8 27.8   ANION GAP 11.2 9.2   BUN 11 11   CREATININE 0.79 0.80   EGFR 91.3 89.9   GLUCOSE 149* 155*   CALCIUM 9.2 8.8   MAGNESIUM 1.8  --    TSH  --  0.010*         Lab 11/05/24  0743 11/03/24 2125   TOTAL PROTEIN 7.5 6.5   ALBUMIN 3.8 3.4*   GLOBULIN 3.7 3.1   ALT (SGPT) 16 13   AST (SGOT) 16 15   BILIRUBIN 0.6 0.4   ALK PHOS 88 80         Lab 11/04/24  0003 11/03/24 2125   PROBNP  --  <36.0   HSTROP T <6 <6                 Lab 11/04/24  0811   PH, ARTERIAL 7.447   PCO2, ARTERIAL 40.7   PO2 ART 76.2*   O2 SATURATION ART 95.7   FIO2 21   HCO3 ART 28.1*   BASE EXCESS ART 3.7*     Brief Urine Lab Results  (Last result in the past 365 days)        Color   Clarity   Blood   Leuk Est   Nitrite   Protein   CREAT   Urine HCG        11/03/24 2214               Negative       11/03/24 2214 Yellow   Clear   Negative   Negative   Negative   Negative                 [x]  Microbiology   Microbiology Results (last 10 days)       Procedure Component Value - Date/Time    Blood Culture - Blood, Arm, Left [[id number]]  (Normal) Collected: 11/03/24 2128    Lab Status: Preliminary result Specimen: Blood from Arm, Left Updated: 11/05/24 2145     Blood Culture No growth at 2 days    Blood Culture - Blood, Arm, Left [[id number]]  (Abnormal) Collected: 11/03/24 2125    Lab Status: Final result Specimen: Blood from Arm, Left Updated: 11/06/24 0612     Blood Culture Staphylococcus, coagulase negative     Isolated from Aerobic Bottle     Gram Stain Aerobic Bottle Gram positive cocci in clusters    Narrative:      Probable contaminant requires clinical correlation, susceptibility not performed unless requested by physician.      Blood Culture ID, PCR - Blood, Arm, Left [[id number]]  (Abnormal) Collected: 11/03/24 2125    Lab Status: Final result Specimen: Blood from Arm,  Left Updated: 11/04/24 2045     BCID, PCR Staph spp, not aureus or lugdunensis. Identification by BCID2 PCR.     BOTTLE TYPE Aerobic Bottle    COVID-19, FLU A/B, RSV PCR 1 HR TAT - Swab, Nasopharynx [[id number]]  (Normal) Collected: 11/03/24 2043    Lab Status: Final result Specimen: Swab from Nasopharynx Updated: 11/03/24 2126     COVID19 Not Detected     Influenza A PCR Not Detected     Influenza B PCR Not Detected     RSV, PCR Not Detected    Narrative:      Fact sheet for providers: https://www.fda.gov/media/215796/download    Fact sheet for patients: https://www.fda.gov/media/589197/download    Test performed by PCR.          [x]  Radiology  MRI Brain Without Contrast    Result Date: 11/5/2024  1.No significant abnormality is identified within the brain. 2.No diffusion restriction is identified to suggest acute infarct. Electronically Signed: Chas Zabala MD  11/5/2024 9:22 AM EST  Workstation ID: [id number]    XR Chest 1 View    Result Date: 11/3/2024  No acute cardiopulmonary disease is seen radiographically. Please see above comments for further detail.    Portions of this note were completed with a voice recognition program.  Electronically Signed By-Husam Cruz MD On:11/3/2024 9:42 PM      CT Head Without Contrast    Result Date: 11/3/2024  No acute brain abnormality is seen. Specifically, no acute intracranial hemorrhage, no acute infarct, no significant intracranial mass lesion, and no acute intracranial mass effect are appreciated.    Portions of this note were completed with a voice recognition program.  Electronically Signed ByDelroy Cruz MD On:11/3/2024 9:28 PM     []  EKG/Telemetry   []  Cardiology/Vascular   []  Pathology  []  Old records  []  Other:    Assessment & Plan   Assessment / Plan     Assessment:  Altered mental status;Improving  UDS positive for THC/benzodiazepines;pt denies taking drugs  History of migraines  History of hypertension  History of hypothyroidism  History of sick  sinus syndrome status post pacemaker placement  MANUELA;non compliant with cpap.  Obesity bmi of 41.  Bilateral trochanteric bursitis    Plan:  Patient may need overnight polysomnography as an outpatient for excessive sleepiness.?  Narcolepsy  Neuro consulted, appreciate assistance;EEG oredered.  CT reviewed,  MRI of brain noted;nothing acute  Patient does not appear to have benzodiazepines on her home med list, does not appear to routinely take these medications.  However we will need to watch out for withdrawal symptoms.  All medications otherwise reviewed and resumed as indicated  As needed Tylenol and IV Toradol for bursitis.  Use Voltaren gel 3 times daily.     Discussed with RN.    VTE Prophylaxis:  Pharmacologic VTE prophylaxis orders are present.        CODE STATUS:   Level Of Support Discussed With: Patient  Code Status (Patient has no pulse and is not breathing): CPR (Attempt to Resuscitate)  Medical Interventions (Patient has pulse or is breathing): Full Support      Electronically signed by Kai Rahman MD, 11/6/2024, 15:11 EST.

## 2024-11-06 NOTE — PLAN OF CARE
Goal Outcome Evaluation:  Plan of Care Reviewed With: patient        Progress: improving  Outcome Evaluation: Pt alert oriented and ablet to make needs clearly known. Pt had EEG done today.  Pt is on a FLEX and has a pacemaker. Pt medicated for pain once this shift. Intervention effective per pt.

## 2024-11-07 LAB — GLUCOSE BLDC GLUCOMTR-MCNC: 143 MG/DL (ref 70–99)

## 2024-11-07 PROCEDURE — 99231 SBSQ HOSP IP/OBS SF/LOW 25: CPT | Performed by: PSYCHIATRY & NEUROLOGY

## 2024-11-07 PROCEDURE — 25010000002 ENOXAPARIN PER 10 MG: Performed by: FAMILY MEDICINE

## 2024-11-07 PROCEDURE — 97530 THERAPEUTIC ACTIVITIES: CPT

## 2024-11-07 PROCEDURE — 25010000002 KETOROLAC TROMETHAMINE PER 15 MG: Performed by: INTERNAL MEDICINE

## 2024-11-07 PROCEDURE — 99232 SBSQ HOSP IP/OBS MODERATE 35: CPT | Performed by: INTERNAL MEDICINE

## 2024-11-07 PROCEDURE — 97116 GAIT TRAINING THERAPY: CPT

## 2024-11-07 PROCEDURE — 82948 REAGENT STRIP/BLOOD GLUCOSE: CPT

## 2024-11-07 RX ORDER — OXYCODONE HYDROCHLORIDE 5 MG/1
2.5 TABLET ORAL EVERY 8 HOURS PRN
Status: DISCONTINUED | OUTPATIENT
Start: 2024-11-07 | End: 2024-11-08 | Stop reason: HOSPADM

## 2024-11-07 RX ORDER — LEVETIRACETAM 500 MG/1
500 TABLET ORAL EVERY 12 HOURS SCHEDULED
Status: DISCONTINUED | OUTPATIENT
Start: 2024-11-07 | End: 2024-11-08 | Stop reason: HOSPADM

## 2024-11-07 RX ORDER — OXYCODONE HYDROCHLORIDE 5 MG/1
5 TABLET ORAL EVERY 8 HOURS PRN
Status: DISCONTINUED | OUTPATIENT
Start: 2024-11-07 | End: 2024-11-08 | Stop reason: HOSPADM

## 2024-11-07 RX ADMIN — METOPROLOL SUCCINATE 50 MG: 50 TABLET, EXTENDED RELEASE ORAL at 09:03

## 2024-11-07 RX ADMIN — Medication 10 ML: at 09:04

## 2024-11-07 RX ADMIN — DULOXETINE HYDROCHLORIDE 60 MG: 30 CAPSULE, DELAYED RELEASE ORAL at 09:02

## 2024-11-07 RX ADMIN — DILTIAZEM HYDROCHLORIDE 240 MG: 240 CAPSULE, EXTENDED RELEASE ORAL at 09:03

## 2024-11-07 RX ADMIN — KETOROLAC TROMETHAMINE 15 MG: 15 INJECTION, SOLUTION INTRAMUSCULAR; INTRAVENOUS at 06:09

## 2024-11-07 RX ADMIN — ENOXAPARIN SODIUM 40 MG: 100 INJECTION SUBCUTANEOUS at 21:55

## 2024-11-07 RX ADMIN — DICLOFENAC SODIUM 2 G: 10 GEL TOPICAL at 21:58

## 2024-11-07 RX ADMIN — OXYCODONE 5 MG: 5 TABLET ORAL at 21:54

## 2024-11-07 RX ADMIN — METOPROLOL SUCCINATE 50 MG: 50 TABLET, EXTENDED RELEASE ORAL at 21:54

## 2024-11-07 RX ADMIN — LEVETIRACETAM 500 MG: 500 TABLET, FILM COATED ORAL at 21:54

## 2024-11-07 RX ADMIN — OXYCODONE 5 MG: 5 TABLET ORAL at 13:40

## 2024-11-07 RX ADMIN — PRAVASTATIN SODIUM 40 MG: 20 TABLET ORAL at 21:54

## 2024-11-07 RX ADMIN — DIGOXIN 250 MCG: 0.12 TABLET ORAL at 09:03

## 2024-11-07 RX ADMIN — DULOXETINE HYDROCHLORIDE 30 MG: 30 CAPSULE, DELAYED RELEASE ORAL at 09:03

## 2024-11-07 RX ADMIN — ENOXAPARIN SODIUM 40 MG: 100 INJECTION SUBCUTANEOUS at 09:04

## 2024-11-07 RX ADMIN — DICLOFENAC SODIUM 2 G: 10 GEL TOPICAL at 16:24

## 2024-11-07 RX ADMIN — DICLOFENAC SODIUM 2 G: 10 GEL TOPICAL at 09:06

## 2024-11-07 RX ADMIN — LEVOTHYROXINE SODIUM 200 MCG: 0.1 TABLET ORAL at 05:27

## 2024-11-07 RX ADMIN — LEVETIRACETAM 500 MG: 500 TABLET, FILM COATED ORAL at 12:28

## 2024-11-07 NOTE — PROGRESS NOTES
[facility]   Hospitalist Progress Note  Date: 2024  Patient Name: Brittny HERNÁNDEZ  : 1975  MRN: [id number]  Date of admission: 11/3/2024  Room/Bed: 228/1      Subjective   Subjective     Chief Complaint:   Altered mental status, lethargy    Summary:  Brittny HERNÁNDEZ is a 49 y.o. female with medical history of hypertension, hypothyroidism, MANUELA, morbid obesity and GERD presenting to the ED for evaluation of altered mental status, and lethargy.  Family was worried because the patient was sleeping most of the day on the  and would immediately go back to sleep when awakened.  When she finally stayed up she was confused, weak, and with some slurred speech so she was brought to the ED for further evaluation.  In the ED patient's vitals were all within normal limits on arrival.  Labs show that she had a TSH of 0.01 but with normal T4 and mild hyperglycemia.  Her drug screen was positive for benzodiazepine and THC-patient does not have prescriptions for either.  CT of the head was negative for any acute findings as well as a chest x-ray.  When seen patient was awake but slow to answer questions.  When asked she denied any recent fevers, chills, headaches, focal weakness, chest pain, palpitation, shortness of breath, cough, abdominal pain, nausea, vomiting, diarrhea, constipation, dysuria, hematuria, hematochezia, melena, or anxiety.  Patient admitted for further evaluation and treatment.  Blood culture 1 out of 2 is coagulase-negative staph likely contamination.  Does have pacemaker.  EEG showed epileptiform discharges on left frontotemporal lobe started on Keppra by neurology     Interval Followup:   Patient awake and alert on rounds this morning.  MRI brain negtive.  Has not used cpap for long time due to face mask problems.  Complaining of bilateral hip pains going down into legs on the sides below knee.  Complaining of excessive tiredness fatigue and daytime sleepiness.  Denies any past history of  fibromyalgia or autoimmune disorder or narcolepsy    Alert and oriented x 3, less lethargic.   at bedside  Patient does have a history of migraines, was recently referred to a neurologist however has not following up with a neurologist at this time.  Patient states she takes Tylenol intermittently to help with her headaches.  Patient's UDS was discussed.  Patient states she only intermittently uses THC, stating its delta 9 she uses, had not been recently.  Patient also denies using benzodiazepines recently.  Does endorse increased stress at work over the past week.       Objective   Objective     Vitals:   Temp:  [97.5 °F (36.4 °C)-97.9 °F (36.6 °C)] 97.9 °F (36.6 °C)  Heart Rate:  [59-64] 61  Resp:  [16-18] 17  BP: ()/(65-89) 124/89    Physical Exam               Constitutional: Awake, alert and oriented x 3.  Slow to respond to questions              Eyes: PERRLA, sclerae anicteric, no conjunctival injection              HENT: NCAT, mucous membranes moist              Neck: Supple, no thyromegaly, no lymphadenopathy, trachea midline              Respiratory: Clear to auscultation bilaterally, nonlabored respirations               Cardiovascular: RRR, no murmurs, rubs, or gallops, palpable pedal pulses bilaterally              Gastrointestinal: Positive bowel sounds, soft, nontender, nondistended              Musculoskeletal: Positive tender bilateral greater trochanteric area, no bilateral ankle edema, no clubbing or cyanosis to extremities              Neurologic: Oriented x 3, strength symmetric in all extremities, Cranial Nerves grossly intact to confrontation, speech clear.  No clonus              Skin: No rashes     Result Review    Result Review:  I have personally reviewed these results:  [x]  Laboratory      Lab 11/05/24  0412 11/03/24 2125   WBC 9.27 10.19   HEMOGLOBIN 14.0 12.6   HEMATOCRIT 41.6 37.5   PLATELETS 284 272   NEUTROS ABS  --  7.12*   IMMATURE GRANS (ABS)  --  0.04   LYMPHS ABS   --  2.13   MONOS ABS  --  0.65   EOS ABS  --  0.20   MCV 84.6 85.0   LACTATE  --  1.1         Lab 11/05/24  0743 11/03/24 2125   SODIUM 135* 140   POTASSIUM 4.1 4.0   CHLORIDE 99 103   CO2 24.8 27.8   ANION GAP 11.2 9.2   BUN 11 11   CREATININE 0.79 0.80   EGFR 91.3 89.9   GLUCOSE 149* 155*   CALCIUM 9.2 8.8   MAGNESIUM 1.8  --    TSH  --  0.010*         Lab 11/05/24  0743 11/03/24 2125   TOTAL PROTEIN 7.5 6.5   ALBUMIN 3.8 3.4*   GLOBULIN 3.7 3.1   ALT (SGPT) 16 13   AST (SGOT) 16 15   BILIRUBIN 0.6 0.4   ALK PHOS 88 80         Lab 11/04/24  0003 11/03/24 2125   PROBNP  --  <36.0   HSTROP T <6 <6                 Lab 11/04/24  0811   PH, ARTERIAL 7.447   PCO2, ARTERIAL 40.7   PO2 ART 76.2*   O2 SATURATION ART 95.7   FIO2 21   HCO3 ART 28.1*   BASE EXCESS ART 3.7*     Brief Urine Lab Results  (Last result in the past 365 days)        Color   Clarity   Blood   Leuk Est   Nitrite   Protein   CREAT   Urine HCG        11/03/24 2214               Negative       11/03/24 2214 Yellow   Clear   Negative   Negative   Negative   Negative                 [x]  Microbiology   Microbiology Results (last 10 days)       Procedure Component Value - Date/Time    Blood Culture - Blood, Arm, Left [[id number]]  (Normal) Collected: 11/03/24 2128    Lab Status: Preliminary result Specimen: Blood from Arm, Left Updated: 11/06/24 2146     Blood Culture No growth at 3 days    Blood Culture - Blood, Arm, Left [[id number]]  (Abnormal) Collected: 11/03/24 2125    Lab Status: Final result Specimen: Blood from Arm, Left Updated: 11/06/24 0612     Blood Culture Staphylococcus, coagulase negative     Isolated from Aerobic Bottle     Gram Stain Aerobic Bottle Gram positive cocci in clusters    Narrative:      Probable contaminant requires clinical correlation, susceptibility not performed unless requested by physician.      Blood Culture ID, PCR - Blood, Arm, Left [[id number]]  (Abnormal) Collected: 11/03/24 2125    Lab Status: Final result Specimen:  Blood from Arm, Left Updated: 11/04/24 2045     BCID, PCR Staph spp, not aureus or lugdunensis. Identification by BCID2 PCR.     BOTTLE TYPE Aerobic Bottle    COVID-19, FLU A/B, RSV PCR 1 HR TAT - Swab, Nasopharynx [[id number]]  (Normal) Collected: 11/03/24 2043    Lab Status: Final result Specimen: Swab from Nasopharynx Updated: 11/03/24 2126     COVID19 Not Detected     Influenza A PCR Not Detected     Influenza B PCR Not Detected     RSV, PCR Not Detected    Narrative:      Fact sheet for providers: https://www.fda.gov/media/842073/download    Fact sheet for patients: https://www.fda.gov/media/736967/download    Test performed by PCR.          [x]  Radiology  MRI Brain Without Contrast    Result Date: 11/5/2024  1.No significant abnormality is identified within the brain. 2.No diffusion restriction is identified to suggest acute infarct. Electronically Signed: Chas Zabala MD  11/5/2024 9:22 AM EST  Workstation ID: [id number]    XR Chest 1 View    Result Date: 11/3/2024  No acute cardiopulmonary disease is seen radiographically. Please see above comments for further detail.    Portions of this note were completed with a voice recognition program.  Electronically Signed By-Husam Cruz MD On:11/3/2024 9:42 PM      CT Head Without Contrast    Result Date: 11/3/2024  No acute brain abnormality is seen. Specifically, no acute intracranial hemorrhage, no acute infarct, no significant intracranial mass lesion, and no acute intracranial mass effect are appreciated.    Portions of this note were completed with a voice recognition program.  Electronically Signed ByDelroy Cruz MD On:11/3/2024 9:28 PM     []  EKG/Telemetry   []  Cardiology/Vascular   []  Pathology  []  Old records  []  Other:    Assessment & Plan   Assessment / Plan     Assessment:  Altered mental status;Improving  UDS positive for THC/benzodiazepines;pt denies taking drugs  History of migraines  History of hypertension  History of  hypothyroidism  History of sick sinus syndrome status post pacemaker placement  MANUELA;non compliant with cpap.  Obesity bmi of 41.  Bilateral greater trochanteric bursitis  Abnormal EEG with epileptiform discharges in left frontotemporal lobe.  On Keppra    Plan:  Patient  need overnight polysomnography as an outpatient for excessive sleepiness.?  Narcolepsy.  Patient already has appointment with pulmonary for sleep study as an outpatient.  Neuro consulted, appreciate assistance;EEG noted.  Started on Keppra  CT reviewed,  MRI of brain noted;nothing acute  Patient does not appear to have benzodiazepines on her home med list, does not appear to routinely take these medications.  However we will need to watch out for withdrawal symptoms.  All medications otherwise reviewed and resumed as indicated  As needed oxycodone, Tylenol and IV Toradol for bursitis.  Use Voltaren gel 3 times daily.     Discussed with RN.  Likely discharge home in a.m. on Keppra    VTE Prophylaxis:  Pharmacologic VTE prophylaxis orders are present.        CODE STATUS:   Level Of Support Discussed With: Patient  Code Status (Patient has no pulse and is not breathing): CPR (Attempt to Resuscitate)  Medical Interventions (Patient has pulse or is breathing): Full Support      Electronically signed by Kai Rahman MD, 11/7/2024, 16:23 EST.

## 2024-11-07 NOTE — THERAPY TREATMENT NOTE
Acute Care - Physical Therapy Treatment Note   [facility]     Patient Name: Brittny HERNÁNDEZ  : 1975  MRN: [id number]  Today's Date: 2024      Visit Dx:     ICD-10-CM ICD-9-CM   1. Altered mental status, unspecified altered mental status type  R41.82 780.97   2. Difficulty in walking  R26.2 719.7     Patient Active Problem List   Diagnosis    AMS (altered mental status)    Essential hypertension    Hypothyroidism (acquired)    Morbid obesity     Past Medical History:   Diagnosis Date    Abnormal Pap smear of cervix     CHF (congestive heart failure)     Depression     H/O blood clots     Heart beat abnormality     Hypertension     Reflux gastritis     Sleep apnea     Thyroid cancer      Past Surgical History:   Procedure Laterality Date    CHOLECYSTECTOMY      ENDOMETRIAL ABLATION      FOOT GANGLION EXCISION Right     GASTRIC FUNDOPLICATION      TUBAL ABDOMINAL LIGATION       PT Assessment (Last 12 Hours)       PT Evaluation and Treatment       Row Name 24 1507          Physical Therapy Time and Intention    Subjective Information complains of  Stiffness in hips, TE education given to reduce stiffness.  -SM     Patient Effort good  -SM     Symptoms Noted During/After Treatment none  -SM       Row Name 24 1507          Pain    Pretreatment Pain Rating 4/10  -SM     Posttreatment Pain Rating 4/10  -     Pain Location hip  -     Pain Side/Orientation bilateral  -SM       Row Name 24 1507          Transfers    Transfers sit-stand transfer;stand-sit transfer  -SM       Row Name 24 1507          Sit-Stand Transfer    Sit-Stand [place] (Transfers) contact guard  -SM     Assistive Device (Sit-Stand Transfers) --  No AD  -SM       Row Name 24 1507          Stand-Sit Transfer    Stand-Sit [place] (Transfers) contact guard  -SM     Assistive Device (Stand-Sit Transfers) --  No AD  -SM       Row Name 24 1507          Gait/Stairs (Locomotion)    Gait/Stairs Locomotion  gait/ambulation assistive device  -     [place] Level (Gait) standby assist  -     Assistive Device (Gait) walker, front-wheeled  -     Patient was able to Ambulate yes  -     Distance in Feet (Gait) 350  50ft attempted w/o AD CGA, pt requested AD due to feeling slightly unbalanced.  -     Pattern (Gait) 3-point  reciprocal  -     Deviations/Abnormal Patterns (Gait) base of support, wide;gait speed decreased  -       Row Name 11/07/24 150          Safety Issues/Impairments Affecting Functional Mobility    Impairments Affecting Function (Mobility) balance;endurance/activity tolerance;cognition  -       Row Name 11/07/24 1507          Balance    Dynamic Standing Balance standby assist;contact guard  -     Position/Device Used, Standing Balance walker, front-wheeled  -       Row Name 11/07/24 1501          Positioning and Restraints    Pre-Treatment Position in bed  -     Post Treatment Position bed  -     In Bed sitting;call light within reach;exit alarm on;with family/caregiver  -       Row Name 11/07/24 5706          Progress Summary (PT)    Progress Toward Functional Goals (PT) progress toward functional goals is good  -     Daily Progress Summary (PT) Pt demonstrates good mobility independence and improving balance and endurance. Continue with POC.  -               User Key  (r) = Recorded By, (t) = Taken By, (c) = Cosigned By      Initials Name Provider Type    Paula Sesay PTA Physical Therapist Assistant                    Physical Therapy Education        No education to display                  PT Recommendation and Plan     Progress Summary (PT)  Progress Toward Functional Goals (PT): progress toward functional goals is good  Daily Progress Summary (PT): Pt demonstrates good mobility independence and improving balance and endurance. Continue with POC.   Outcome Measures       Row Name 11/07/24 7400             How much help from another person do you currently need...     Turning from your back to your side while in flat bed without using bedrails? 4  -SM      Moving from lying on back to sitting on the side of a flat bed without bedrails? 4  -SM      Moving to and from a bed to a chair (including a wheelchair)? 4  -SM      Standing up from a chair using your arms (e.g., wheelchair, bedside chair)? 4  -SM      Climbing 3-5 steps with a railing? 3  -SM      To walk in hospital room? 3  -SM      AM-PAC 6 Clicks Score (PT) 22  -SM                User Key  (r) = Recorded By, (t) = Taken By, (c) = Cosigned By      Initials Name Provider Type    Paula Sesay PTA Physical Therapist Assistant                     Time Calculation:    PT Charges       Row Name 11/07/24 1507             Time Calculation    PT Received On 11/07/24  -SM         Timed Charges    40196 - Gait Training Minutes  15  -SM      30908 - PT Therapeutic Activity Minutes 8  -SM         Total Minutes    Timed Charges Total Minutes 23  -SM       Total Minutes 23  -SM                User Key  (r) = Recorded By, (t) = Taken By, (c) = Cosigned By      Initials Name Provider Type    Paula Sesay PTA Physical Therapist Assistant                      PT G-Codes  AM-PAC 6 Clicks Score (PT): 22    Paula Shipman PTA  11/7/2024

## 2024-11-07 NOTE — PLAN OF CARE
Goal Outcome Evaluation:  Plan of Care Reviewed With: patient        Progress: no change  Outcome Evaluation: pt. is aao and calls for assistance.  pt. medicated x one for hip pain with relief noted.  no significant changes noted this shift.

## 2024-11-07 NOTE — PROGRESS NOTES
"[facility] Tele[facility]urology Progress Note    Date of Service 11/7/2024      Presentation:    Based on previous neurology note(s)   : \" Patient is a(n) 49 years old female with past medical history of hypertension, hypothyroidism, Obstructive Sleep Apnea, morbid obesity and GERD presenting to the ED for evaluation of altered mental status, and lethargy.      Found to have THC and Benzodiazepine in UDS (no prescription for such)        Interval history:           11/5/2024: more awake today.  11/6: mental status improving   11/7: nursing does not report any significant new events overnight.      Impression:     1. hypoxemia  2. Benzodiazepine and THC use  3. Altered mental status, likely due to the above  4. Sick sinus syndrome and SVTs, has pacemaker   5-        history of thyroid cancer  6-       left frontotemporal epileptiforms        Results and Recommendations:   (Primary Team to order Controlled Medications)     1- Toxic metabolic encephalopathy workup, Supportive and symptomatic treatment, electrolyte management and antibiotics per primary team.  Team to address low TSH     2- Delirium precautions:  Blinds open during the day, closed at night, frequent reorientation, minimize nighttime interruptions, when possible avoid benzodiazepines (except with CIWA protocol), opioid pain medications, anticholinergic medications, and other sedative medications.  If patient gets agitated, try verbal de-escalation first.      3- if mental status not back to baseline (albeit improved) ordered EEG 11/5 which showed left frontotemporal epileptiforms;   11/7 I discussed findings with her and started keppra 500mg BID to monitor response.      4- Brain MRI No Acute Intracranial Abnormality.            Neurology will sign off.  Follow up with outpatient neurology in 3-4 weeks.    Please contact [facility] Nav[facility] to reach me if further questions/concerns arise.       Examination:     awake, alert, oriented x3        "   speech no aphasia  Extraocular movements intact  face symmetric  arms no drift (10s)  coordination intact in finger to nose    -----------------------------------------------------------  Patient / Family was informed the Neurology Consult would occur via TeleHealth consult by way of interactive audio and video telecommunications and consented to receiving care in this manner.     Patient is being evaluated for possible acute neurologic impairment and high probability of imminent or life - threatening deterioration. I spent total of 12 minutes providing care to this patient, including time for face to face visit via telemedicine, review of medical records, imaging studies and discussion of findings with providers, the patient and / or family.        Dr Vahid Behravan        [facility]  For Inpatient follow-up with [facility] physician please call [facility] [phone]. This is not an outpatient service. Post hospital discharge, please contact hospital directly.     Please do not communicate with [facility] physicians via secure chat. If you have any questions, Please contact [facility].  Please call or reconsult our service if there are any clinical or diagnostic changes.

## 2024-11-07 NOTE — PLAN OF CARE
Goal Outcome Evaluation:  Plan of Care Reviewed With: patient        Progress: improving  Outcome Evaluation: Pt alert oriented and able to make needs known. VSS Pt lost iv access MD aware.  remains bedside. Pt ambulated over 50 + feet with therapy using gait belt.

## 2024-11-08 VITALS
RESPIRATION RATE: 16 BRPM | HEIGHT: 62 IN | TEMPERATURE: 97.7 F | DIASTOLIC BLOOD PRESSURE: 69 MMHG | HEART RATE: 61 BPM | BODY MASS INDEX: 41.38 KG/M2 | WEIGHT: 224.87 LBS | SYSTOLIC BLOOD PRESSURE: 102 MMHG | OXYGEN SATURATION: 93 %

## 2024-11-08 PROBLEM — R41.82 AMS (ALTERED MENTAL STATUS): Status: RESOLVED | Noted: 2024-11-03 | Resolved: 2024-11-08

## 2024-11-08 LAB — BACTERIA SPEC AEROBE CULT: NORMAL

## 2024-11-08 PROCEDURE — 25010000002 ENOXAPARIN PER 10 MG: Performed by: FAMILY MEDICINE

## 2024-11-08 PROCEDURE — 99239 HOSP IP/OBS DSCHRG MGMT >30: CPT | Performed by: INTERNAL MEDICINE

## 2024-11-08 RX ORDER — LEVETIRACETAM 500 MG/1
500 TABLET ORAL EVERY 12 HOURS SCHEDULED
Qty: 60 TABLET | Refills: 0 | Status: SHIPPED | OUTPATIENT
Start: 2024-11-08 | End: 2024-12-08

## 2024-11-08 RX ADMIN — DICLOFENAC SODIUM 2 G: 10 GEL TOPICAL at 09:28

## 2024-11-08 RX ADMIN — DULOXETINE HYDROCHLORIDE 30 MG: 30 CAPSULE, DELAYED RELEASE ORAL at 09:22

## 2024-11-08 RX ADMIN — DIGOXIN 250 MCG: 0.12 TABLET ORAL at 09:21

## 2024-11-08 RX ADMIN — LEVETIRACETAM 500 MG: 500 TABLET, FILM COATED ORAL at 09:22

## 2024-11-08 RX ADMIN — DULOXETINE HYDROCHLORIDE 60 MG: 30 CAPSULE, DELAYED RELEASE ORAL at 09:21

## 2024-11-08 RX ADMIN — LEVOTHYROXINE SODIUM 200 MCG: 0.1 TABLET ORAL at 05:30

## 2024-11-08 RX ADMIN — ENOXAPARIN SODIUM 40 MG: 100 INJECTION SUBCUTANEOUS at 09:21

## 2024-11-08 RX ADMIN — OXYCODONE 5 MG: 5 TABLET ORAL at 06:40

## 2024-11-08 RX ADMIN — DILTIAZEM HYDROCHLORIDE 240 MG: 240 CAPSULE, EXTENDED RELEASE ORAL at 09:20

## 2024-11-08 NOTE — PLAN OF CARE
Goal Outcome Evaluation:  Plan of Care Reviewed With: patient        Progress: improving  Outcome Evaluation: Pt. VSS, remains A & O X 4. Stand by assist to restroom. No new concerns stated. Roxicodone given for pain control once this shift per orders.

## 2024-11-08 NOTE — PLAN OF CARE
Goal Outcome Evaluation:  Plan of Care Reviewed With: patient        Progress: improving  Outcome Evaluation: Pt alert oriented and able to make needs clearly known. No new concerns stated at this time.

## 2024-11-08 NOTE — DISCHARGE SUMMARY
[facility]        HOSPITALIST  DISCHARGE SUMMARY    Patient Name: Brittny HERNÁNDEZ  : 1975  MRN: [id number]    Date of Admission: 11/3/2024  Date of Discharge:  2024  Primary Care Physician: Harshakumar, Sreedevi Pallath, MD    Consults       Date and Time Order Name Status Description    2024  9:15 AM Inpatient Neurology Consult General Completed     11/3/2024 11:31 PM Inpatient Hospitalist Consult              Active and Resolved Hospital Problems:  Active Hospital Problems    Diagnosis POA   • Essential hypertension [I10] Yes   • Hypothyroidism (acquired) [E03.9] Yes   • Morbid obesity [E66.01] Yes      Resolved Hospital Problems    Diagnosis POA   • **AMS (altered mental status) [R41.82] Yes   Altered mental status and excessive sleepiness;Improving  UDS positive for THC/benzodiazepines;pt denies taking drugs  History of migraines  History of hypertension  History of hypothyroidism  History of sick sinus syndrome status post pacemaker placement  MANUELA;non compliant with cpap due to mask issues.  Obesity bmi of 41.  Bilateral greater trochanteric bursitis  Abnormal EEG with epileptiform discharges in left frontotemporal lobe.  On [facility]       Hospital Course     Hospital Course:  Brittny HERNÁNDEZ is a 49 y.o. female  with medical history of hypertension, hypothyroidism, MANUELA, morbid obesity and GERD presenting to the ED for evaluation of altered mental status, and lethargy.  Family was worried because the patient was sleeping most of the day on the  and would immediately go back to sleep when awakened.  When she finally stayed up she was confused, weak, and with some slurred speech so she was brought to the ED for further evaluation.  In the ED patient's vitals were all within normal limits on arrival.  Labs show that she had a TSH of 0.01 but with normal T4 and mild hyperglycemia.  Her drug screen was positive for benzodiazepine and THC-patient does not have prescriptions for  either.  CT of the head was negative for any acute findings as well as a chest x-ray.  When seen patient was awake but slow to answer questions.  When asked she denied any recent fevers, chills, headaches, focal weakness, chest pain, palpitation, shortness of breath, cough, abdominal pain, nausea, vomiting, diarrhea, constipation, dysuria, hematuria, hematochezia, melena, or anxiety.  Patient admitted for further evaluation and treatment.  Blood culture 1 out of 2 is coagulase-negative staph likely contamination.  Does have pacemaker.  EEG showed epileptiform discharges on left frontotemporal lobe started on Keppra by neurology     Interval Followup:   Patient awake and alert on rounds this morning.  MRI brain negtive.  Has not used cpap for long time due to face mask problems.  Complaining of bilateral hip pains going down into legs on the sides below knee.  Complaining of excessive tiredness fatigue and daytime sleepiness.  Denies any past history of fibromyalgia or autoimmune disorder or narcolepsy    Alert and oriented x 3, less lethargic.   at bedside  Patient does have a history of migraines, was recently referred to a neurologist however has not following up with a neurologist at this time.  Patient states she takes Tylenol intermittently to help with her headaches.  Patient's UDS was discussed.  Patient states she only intermittently uses THC, stating its delta 9 she uses, had not been recently.  Patient also denies using benzodiazepines recently.  Does endorse increased stress at work over the past week.       DISCHARGE Follow Up Recommendations for labs and diagnostics: PCP, neurology, cardiology and sleep study.  Driving and work restriction as per state law for seizure disorder until seen by neurology.      Day of Discharge     Vital Signs:  Temp:  [97.3 °F (36.3 °C)-97.9 °F (36.6 °C)] 97.7 °F (36.5 °C)  Heart Rate:  [59-61] 61  Resp:  [16] 16  BP: ()/(65-81) 102/69    Physical Exam:    Constitutional: Awake, alert and oriented x 3.  Slow to respond to questions              Eyes: PERRLA, sclerae anicteric, no conjunctival injection              HENT: NCAT, mucous membranes moist              Neck: Supple, no thyromegaly, no lymphadenopathy, trachea midline              Respiratory: Clear to auscultation bilaterally, nonlabored respirations               Cardiovascular: RRR, no murmurs, rubs, or gallops, palpable pedal pulses bilaterally              Gastrointestinal: Positive bowel sounds, soft, nontender, nondistended              Musculoskeletal: Positive tender bilateral greater trochanteric area, no bilateral ankle edema, no clubbing or cyanosis to extremities              Neurologic: Oriented x 3, strength symmetric in all extremities, Cranial Nerves grossly intact to confrontation, speech clear.  No clonus              Skin: No rashes     Discharge Details        Discharge Medications        New Medications        Instructions Start Date   levETIRAcetam 500 MG tablet  Commonly known as: KEPPRA   500 mg, Oral, Every 12 Hours Scheduled             Continue These Medications        Instructions Start Date   diclofenac 0.1 % ophthalmic solution  Commonly known as: VOLTAREN   1 drop, Ophthalmic, 4 Times Daily      digoxin 250 MCG tablet  Commonly known as: LANOXIN   250 mcg, Daily      dilTIAZem 240 MG 24 hr capsule  Commonly known as: TIAZAC   240 mg, Daily      DULoxetine 60 MG capsule  Commonly known as: CYMBALTA   60 mg, Oral, Daily, Total daily dose 90 mg      DULoxetine 30 MG capsule  Commonly known as: CYMBALTA   30 mg, Oral, Daily, Total daily dose 90 mg      hydrOXYzine 25 MG tablet  Commonly known as: ATARAX   1 tablet, Every 8 Hours PRN      lansoprazole 30 MG capsule  Commonly known as: PREVACID   30 mg, Daily      levothyroxine 200 MCG tablet  Commonly known as: SYNTHROID, LEVOTHROID   200 mcg, Oral, Daily      metoprolol succinate XL 50 MG 24 hr tablet  Commonly known as:  TOPROL-XL   50 mg, 2 Times Daily      pravastatin 40 MG tablet  Commonly known as: PRAVACHOL   40 mg, Nightly      Qulipta 60 MG tablet  Generic drug: Atogepant   1 tablet, Daily      vitamin D 1.25 MG (44346 UT) capsule capsule  Commonly known as: ERGOCALCIFEROL   50,000 Units, Every 7 Days             Stop These Medications      doxycycline 100 MG capsule  Commonly known as: MONODOX              Allergies   Allergen Reactions   • Codeine    • Demerol [Meperidine]    • Percocet [Oxycodone-Acetaminophen]        Discharge Disposition:  Home or Self Care.  In private vehicle with spouse    Diet:  Diet Instructions       Diet: Cardiac Diets; Healthy Heart (2-3 Na+); Thin (IDDSI 0)      Discharge Diet: Cardiac Diets    Cardiac Diet: Healthy Heart (2-3 Na+)    Fluid Consistency: Thin (IDDSI 0)            Discharge Activity:   Activity Instructions       Gradually Increase Activity Until at Pre-Hospitalization Level      Follow state law and work restrictions for seizure disorder            CODE STATUS:  Code Status and Medical Interventions: CPR (Attempt to Resuscitate); Full Support   Ordered at: 11/03/24 1907     Level Of Support Discussed With:    Patient     Code Status (Patient has no pulse and is not breathing):    CPR (Attempt to Resuscitate)     Medical Interventions (Patient has pulse or is breathing):    Full Support         No future appointments.    Additional Instructions for the Follow-ups that You Need to Schedule       Discharge Follow-up with PCP   As directed       Currently Documented PCP:    Harshakumar, Sreedevi Pallath, MD    PCP Phone Number:    [phone]     Follow Up Details: 1 week        Discharge Follow-up with Specialty: Sleep study as scheduled   As directed      Specialty: Sleep study as scheduled        Discharge Follow-up with Specified Provider: Cardiology   As directed      To: Cardiology        Discharge Follow-up with Specified Provider: Dr. Pantoja; 3 Weeks   As directed      To:  Dr. Pantoja   Follow Up: 3 Weeks   Follow Up Details: Seizures.  Excessive sleepiness and confusion                Pertinent  and/or Most Recent Results     PROCEDURES:   * Cannot find OR case *     LAB RESULTS:      Lab 11/05/24  0412 11/03/24 2125   WBC 9.27 10.19   HEMOGLOBIN 14.0 12.6   HEMATOCRIT 41.6 37.5   PLATELETS 284 272   NEUTROS ABS  --  7.12*   IMMATURE GRANS (ABS)  --  0.04   LYMPHS ABS  --  2.13   MONOS ABS  --  0.65   EOS ABS  --  0.20   MCV 84.6 85.0   LACTATE  --  1.1         Lab 11/05/24  0743 11/03/24 2125   SODIUM 135* 140   POTASSIUM 4.1 4.0   CHLORIDE 99 103   CO2 24.8 27.8   ANION GAP 11.2 9.2   BUN 11 11   CREATININE 0.79 0.80   EGFR 91.3 89.9   GLUCOSE 149* 155*   CALCIUM 9.2 8.8   MAGNESIUM 1.8  --    TSH  --  0.010*         Lab 11/05/24  0743 11/03/24 2125   TOTAL PROTEIN 7.5 6.5   ALBUMIN 3.8 3.4*   GLOBULIN 3.7 3.1   ALT (SGPT) 16 13   AST (SGOT) 16 15   BILIRUBIN 0.6 0.4   ALK PHOS 88 80         Lab 11/04/24  0003 11/03/24 2125   PROBNP  --  <36.0   HSTROP T <6 <6                 Lab 11/04/24  0811   PH, ARTERIAL 7.447   PCO2, ARTERIAL 40.7   PO2 ART 76.2*   O2 SATURATION ART 95.7   FIO2 21   HCO3 ART 28.1*   BASE EXCESS ART 3.7*     Brief Urine Lab Results  (Last result in the past 365 days)        Color   Clarity   Blood   Leuk Est   Nitrite   Protein   CREAT   Urine HCG        11/03/24 2214               Negative       11/03/24 2214 Yellow   Clear   Negative   Negative   Negative   Negative                 Microbiology Results (last 10 days)       Procedure Component Value - Date/Time    Blood Culture - Blood, Arm, Left [[id number]]  (Normal) Collected: 11/03/24 2128    Lab Status: Preliminary result Specimen: Blood from Arm, Left Updated: 11/07/24 2146     Blood Culture No growth at 4 days    Blood Culture - Blood, Arm, Left [[id number]]  (Abnormal) Collected: 11/03/24 2125    Lab Status: Final result Specimen: Blood from Arm, Left Updated: 11/06/24 0612     Blood Culture  Staphylococcus, coagulase negative     Isolated from Aerobic Bottle     Gram Stain Aerobic Bottle Gram positive cocci in clusters    Narrative:      Probable contaminant requires clinical correlation, susceptibility not performed unless requested by physician.      Blood Culture ID, PCR - Blood, Arm, Left [[id number]]  (Abnormal) Collected: 11/03/24 2125    Lab Status: Final result Specimen: Blood from Arm, Left Updated: 11/04/24 2045     BCID, PCR Staph spp, not aureus or lugdunensis. Identification by BCID2 PCR.     BOTTLE TYPE Aerobic Bottle    COVID-19, FLU A/B, RSV PCR 1 HR TAT - Swab, Nasopharynx [[id number]]  (Normal) Collected: 11/03/24 2043    Lab Status: Final result Specimen: Swab from Nasopharynx Updated: 11/03/24 2126     COVID19 Not Detected     Influenza A PCR Not Detected     Influenza B PCR Not Detected     RSV, PCR Not Detected    Narrative:      Fact sheet for providers: https://www.fda.gov/media/223691/download    Fact sheet for patients: https://www.fda.gov/media/536433/download    Test performed by PCR.            MRI Brain Without Contrast    Result Date: 11/5/2024  Impression: 1.No significant abnormality is identified within the brain. 2.No diffusion restriction is identified to suggest acute infarct. Electronically Signed: Chas Zabala MD  11/5/2024 9:22 AM EST  Workstation ID: [id number]    XR Chest 1 View    Result Date: 11/3/2024  Impression: No acute cardiopulmonary disease is seen radiographically. Please see above comments for further detail.    Portions of this note were completed with a voice recognition program.  Electronically Signed By-Husam Cruz MD On:11/3/2024 9:42 PM      CT Head Without Contrast    Result Date: 11/3/2024  Impression: No acute brain abnormality is seen. Specifically, no acute intracranial hemorrhage, no acute infarct, no significant intracranial mass lesion, and no acute intracranial mass effect are appreciated.    Portions of this note were completed with  a voice recognition program.  Electronically Signed By-Husam Cruz MD On:11/3/2024 9:28 PM                   Imaging Results (All)       Procedure Component Value Units Date/Time    MRI Brain Without Contrast [[id number]] Collected: 11/05/24 0919     Updated: 11/05/24 0925    Narrative:      MRI BRAIN WO CONTRAST    Date of Exam: 11/5/2024 8:45 AM EST    Indication: AMS.     Comparison: Noncontrast head CT dated 11/3/2024    Technique:  Routine multiplanar/multisequence sequence images of the brain were obtained without contrast administration.    FINDINGS:  The brain parenchyma appears unremarkable in volume and signal intensity on the obtained sequences. Midline structures appear unremarkable. No significant mass effect, intracranial hemorrhage, or hydrocephalus is identified. Diffusion-weighted sequences   demonstrate no acute infarct.The visualized intracranial flow-voids appear unremarkable. The paranasal sinuses and mastoid air cells show no fluid signal. The orbits, globes, retrobulbar soft tissues appear unremarkable. The visualized superficial soft   tissues and cervical spine demonstrate no significant abnormality.      Impression:      1.No significant abnormality is identified within the brain.  2.No diffusion restriction is identified to suggest acute infarct.    Electronically Signed: Chas Zabala MD    11/5/2024 9:22 AM EST    Workstation ID: [id number]    XR Chest 1 View [[id number]] Collected: 11/03/24 2135     Updated: 11/03/24 2144    Narrative:      XR CHEST 1 VW-     Date of exam: 11/3/2024 8:46 PM.     Indication: ams  (altered mental status)        Comparison: None available.     FINDINGS:  A single frontal (AP or PA upright portable) chest radiograph reveals  borderline cardiac enlargement and no acute infiltrate. No pneumothorax  is seen. No pleural effusion. There is a left-sided cardiac implantable  electronic device (CIED) in place. There is pulmonary hypoinflation.  Nonspecific  punctate hyperdensity is projected over the superior  mediastinum, above the level of the cardiac pacing leads. It measures  about 2 to 3 mm in size and is of uncertain significance or origin.          Impression:      No acute cardiopulmonary disease is seen radiographically. Please see  above comments for further detail.           Portions of this note were completed with a voice recognition program.     Electronically Signed By-Husam Cruz MD On:11/3/2024 9:42 PM       CT Head Without Contrast [[id number]] Collected: 11/03/24 2124     Updated: 11/03/24 2130    Narrative:      CT HEAD WO CONTRAST-     Date of exam: 11/3/2024 9:11 PM.     Indication: ams (altered mental status); no history of head trauma is  provided     Comparison: None available.     TECHNIQUE:  Axial CT images were obtained of the head/brain without contrast  administration. Reconstructed 2D (two-dimensional) coronal and sagittal  images were also obtained. Automated exposure control and iterative  construction methods were used. Additionally, the radiation dose  reduction device was turned on for each scan per the ALARA (As Low as  Reasonably Achievable) protocol. Please see the electronic medical  record (EMR; Epic) for the recorded radiation dose.     FINDINGS:  A routine nonenhanced head CT was performed. No acute brain abnormality  is identified. No acute intracranial hemorrhage. No acute infarct is  seen. The gray-white matter differentiation is preserved. No midline  shift or acute intracranial mass effect is seen. The ventricular size  and configuration are within normal limits. The extra-axial spaces are  within normal limits. No acute skull fracture. No significant acute  findings are seen with regard to the imaged orbits, paranasal sinuses,  or middle ear clefts.          Impression:      No acute brain abnormality is seen. Specifically, no acute intracranial  hemorrhage, no acute infarct, no significant intracranial mass  lesion,  and no acute intracranial mass effect are appreciated.           Portions of this note were completed with a voice recognition program.     Electronically Signed By-Husam Cruz MD On:11/3/2024 9:28 PM                Labs Pending at Discharge:  Pending Labs       Order Current Status    Blood Culture - Blood, Arm, Left Preliminary result                Time spent on Discharge including face to face service: 31 minutes  Part of this note may be an electronic transcription/translation of spoken language to printed text using the Dragon Dictation System.     TElectronically signed by Kai Rahman MD, 11/08/24, 4:43 PM EST.

## 2024-11-09 ENCOUNTER — READMISSION MANAGEMENT (OUTPATIENT)
Dept: CALL CENTER | Facility: HOSPITAL | Age: 49
End: 2024-11-09
Payer: COMMERCIAL

## 2024-11-09 NOTE — OUTREACH NOTE
Prep Survey      Flowsheet Row Responses   [facility] facility patient discharged from? [facility]   Is LACE score < 7 ? No   Eligibility Readm Mgmt   Discharge diagnosis AMS (altered mental status)   Does the patient have one of the following disease processes/diagnoses(primary or secondary)? Other   Does the patient have Home health ordered? No   Is there a DME ordered? No   Medication alerts for this patient see AVS   Prep survey completed? Yes            Tia EARL - Registered Nurse

## 2024-11-14 NOTE — PROGRESS NOTES
"Enter Query Response Below      Query Response: Altered mental status likely from complex partial seizures.    Electronically signed by Kai Rahman MD, 24, 4:37 PM EST.               If applicable, please update the problem list.     Patient: Brittny Patel        : 1975  Account: [id number]           Admit Date: 11/3/2024        How to Respond to this query:       a. Click New Note     b. Answer query within the yellow box.                c. Update the Problem List, if applicable.      If you have any questions about this query contact me at: [email or web address]     :     Risk Factors: \"49 y.o. female with past medical history of hypertension, hypothyroidism, MANUELA,  morbid obesity and GERD \" admitted 11/3 (OBS),  (IP) with \"Altered mental status/generalized  weakness\" per the H&P.  Clinical Indicators/Treatment:   Neurology consult- progress note \"Hypoxemia, benzodiazepine and THC use. Altered  mental status, likely due to the above.....Mental Status: Lethargic, oriented to self, month, place,  for year stated 84.\"   Neurology progress note \"if mental status not back to baseline (albeit improved) ordered EEG   which showed left frontotemporal epileptiforms;  I discussed findings with her and started  keppra 500mg BID to monitor response.\"  Discharge summary \"Patient states she only intermittently uses THC, stating its delta 9 she uses,had not been recently. Patient also denies using benzodiazepines recently.  Does endorse increased stress at work over the past week.\"    Please clarify the etiology of the patient's altered mental status: __________________    By submitting this query, we are merely seeking further clarification of documentation to accurately reflect all conditions that you are monitoring, evaluating, treating or that extend the hospitalization or utilize additional resources of care. Please utilize your independent clinical judgment when addressing " the question(s) above.     This query and your response, once completed, will be entered into the legal medical record.    Sincerely,  Alcira Calloway RN  Clinical Documentation Integrity Program

## 2024-11-20 ENCOUNTER — READMISSION MANAGEMENT (OUTPATIENT)
Dept: CALL CENTER | Facility: HOSPITAL | Age: 49
End: 2024-11-20
Payer: COMMERCIAL

## 2024-11-20 NOTE — OUTREACH NOTE
Medical Week 2 Survey      Flowsheet Row Responses   [facility] facility patient discharged from? [facility]   Does the patient have one of the following disease processes/diagnoses(primary or secondary)? Other   Week 2 attempt successful? No   Unsuccessful attempts Attempt 1            Hyacinth Wright Registered Nurse

## 2024-11-26 ENCOUNTER — READMISSION MANAGEMENT (OUTPATIENT)
Dept: CALL CENTER | Facility: HOSPITAL | Age: 49
End: 2024-11-26
Payer: COMMERCIAL

## 2024-11-26 NOTE — OUTREACH NOTE
Medical Week 3 Survey      Flowsheet Row Responses   [facility] patient discharged from? [facility]   Does the patient have one of the following disease processes/diagnoses(primary or secondary)? Other   Week 3 attempt successful? No   Call start time 1616   Unsuccessful attempts Attempt 1  [Mother deferred questions to pt]   Discharge diagnosis AMS (altered mental status)            Macrina H - Registered Nurse

## 2024-12-02 ENCOUNTER — READMISSION MANAGEMENT (OUTPATIENT)
Dept: CALL CENTER | Facility: HOSPITAL | Age: 49
End: 2024-12-02
Payer: COMMERCIAL

## 2024-12-02 NOTE — OUTREACH NOTE
Medical Week 3 Survey      Flowsheet Row Responses   [facility] facility patient discharged from? [facility]   Does the patient have one of the following disease processes/diagnoses(primary or secondary)? Other   Week 3 attempt successful? No   Unsuccessful attempts Attempt 2            Skylar ARRINGTON - Registered Nurse